# Patient Record
Sex: FEMALE | Race: BLACK OR AFRICAN AMERICAN | NOT HISPANIC OR LATINO | Employment: STUDENT | URBAN - METROPOLITAN AREA
[De-identification: names, ages, dates, MRNs, and addresses within clinical notes are randomized per-mention and may not be internally consistent; named-entity substitution may affect disease eponyms.]

---

## 2022-05-14 ENCOUNTER — HOSPITAL ENCOUNTER (INPATIENT)
Facility: OTHER | Age: 42
LOS: 4 days | Discharge: HOME | DRG: 743 | End: 2022-05-18
Attending: EMERGENCY MEDICINE | Admitting: OBSTETRICS & GYNECOLOGY
Payer: MEDICAID

## 2022-05-14 DIAGNOSIS — D21.9 FIBROIDS: Primary | ICD-10-CM

## 2022-05-14 DIAGNOSIS — R07.9 CHEST PAIN: ICD-10-CM

## 2022-05-14 DIAGNOSIS — Z98.890 POST-OPERATIVE STATE: ICD-10-CM

## 2022-05-14 DIAGNOSIS — N93.9 VAGINAL BLEEDING: ICD-10-CM

## 2022-05-14 LAB
ALBUMIN SERPL BCP-MCNC: 3.7 G/DL (ref 3.5–5.2)
ALP SERPL-CCNC: 72 U/L (ref 55–135)
ALT SERPL W/O P-5'-P-CCNC: 11 U/L (ref 10–44)
ANION GAP SERPL CALC-SCNC: 13 MMOL/L (ref 8–16)
AST SERPL-CCNC: 16 U/L (ref 10–40)
BACTERIA GENITAL QL WET PREP: ABNORMAL
BASOPHILS # BLD AUTO: 0.04 K/UL (ref 0–0.2)
BASOPHILS NFR BLD: 0.3 % (ref 0–1.9)
BILIRUB SERPL-MCNC: 0.1 MG/DL (ref 0.1–1)
BUN SERPL-MCNC: 10 MG/DL (ref 6–20)
CALCIUM SERPL-MCNC: 9.8 MG/DL (ref 8.7–10.5)
CHLORIDE SERPL-SCNC: 106 MMOL/L (ref 95–110)
CLUE CELLS VAG QL WET PREP: ABNORMAL
CO2 SERPL-SCNC: 23 MMOL/L (ref 23–29)
CREAT SERPL-MCNC: 0.8 MG/DL (ref 0.5–1.4)
CTP QC/QA: YES
DIFFERENTIAL METHOD: ABNORMAL
EOSINOPHIL # BLD AUTO: 0.3 K/UL (ref 0–0.5)
EOSINOPHIL NFR BLD: 2.4 % (ref 0–8)
ERYTHROCYTE [DISTWIDTH] IN BLOOD BY AUTOMATED COUNT: 16.2 % (ref 11.5–14.5)
EST. GFR  (AFRICAN AMERICAN): >60 ML/MIN/1.73 M^2
EST. GFR  (NON AFRICAN AMERICAN): >60 ML/MIN/1.73 M^2
FILAMENT FUNGI VAG WET PREP-#/AREA: ABNORMAL
GLUCOSE SERPL-MCNC: 95 MG/DL (ref 70–110)
HCT VFR BLD AUTO: 31.7 % (ref 37–48.5)
HCV AB SERPL QL IA: NEGATIVE
HGB BLD-MCNC: 10.3 G/DL (ref 12–16)
HIV 1+2 AB+HIV1 P24 AG SERPL QL IA: NEGATIVE
IMM GRANULOCYTES # BLD AUTO: 0.04 K/UL (ref 0–0.04)
IMM GRANULOCYTES NFR BLD AUTO: 0.3 % (ref 0–0.5)
LYMPHOCYTES # BLD AUTO: 1.9 K/UL (ref 1–4.8)
LYMPHOCYTES NFR BLD: 14.1 % (ref 18–48)
MCH RBC QN AUTO: 28.5 PG (ref 27–31)
MCHC RBC AUTO-ENTMCNC: 32.5 G/DL (ref 32–36)
MCV RBC AUTO: 88 FL (ref 82–98)
MONOCYTES # BLD AUTO: 1 K/UL (ref 0.3–1)
MONOCYTES NFR BLD: 7.4 % (ref 4–15)
NEUTROPHILS # BLD AUTO: 10.4 K/UL (ref 1.8–7.7)
NEUTROPHILS NFR BLD: 75.5 % (ref 38–73)
NRBC BLD-RTO: 0 /100 WBC
PLATELET # BLD AUTO: 344 K/UL (ref 150–450)
PMV BLD AUTO: 8.3 FL (ref 9.2–12.9)
POTASSIUM SERPL-SCNC: 3.8 MMOL/L (ref 3.5–5.1)
PROT SERPL-MCNC: 7.1 G/DL (ref 6–8.4)
RBC # BLD AUTO: 3.61 M/UL (ref 4–5.4)
SARS-COV-2 RDRP RESP QL NAA+PROBE: NEGATIVE
SODIUM SERPL-SCNC: 142 MMOL/L (ref 136–145)
SPECIMEN SOURCE: ABNORMAL
T VAGINALIS GENITAL QL WET PREP: ABNORMAL
WBC # BLD AUTO: 13.78 K/UL (ref 3.9–12.7)
WBC #/AREA VAG WET PREP: ABNORMAL
YEAST GENITAL QL WET PREP: ABNORMAL

## 2022-05-14 PROCEDURE — 12000002 HC ACUTE/MED SURGE SEMI-PRIVATE ROOM

## 2022-05-14 PROCEDURE — 96374 THER/PROPH/DIAG INJ IV PUSH: CPT

## 2022-05-14 PROCEDURE — 87591 N.GONORRHOEAE DNA AMP PROB: CPT | Performed by: PHYSICIAN ASSISTANT

## 2022-05-14 PROCEDURE — 85025 COMPLETE CBC W/AUTO DIFF WBC: CPT | Performed by: PHYSICIAN ASSISTANT

## 2022-05-14 PROCEDURE — 86803 HEPATITIS C AB TEST: CPT | Performed by: PHYSICIAN ASSISTANT

## 2022-05-14 PROCEDURE — G0378 HOSPITAL OBSERVATION PER HR: HCPCS

## 2022-05-14 PROCEDURE — 25000003 PHARM REV CODE 250: Performed by: PHYSICIAN ASSISTANT

## 2022-05-14 PROCEDURE — 87491 CHLMYD TRACH DNA AMP PROBE: CPT | Performed by: PHYSICIAN ASSISTANT

## 2022-05-14 PROCEDURE — 87389 HIV-1 AG W/HIV-1&-2 AB AG IA: CPT | Performed by: PHYSICIAN ASSISTANT

## 2022-05-14 PROCEDURE — 99285 EMERGENCY DEPT VISIT HI MDM: CPT | Mod: 25

## 2022-05-14 PROCEDURE — 87210 SMEAR WET MOUNT SALINE/INK: CPT | Performed by: PHYSICIAN ASSISTANT

## 2022-05-14 PROCEDURE — 63600175 PHARM REV CODE 636 W HCPCS: Performed by: PHYSICIAN ASSISTANT

## 2022-05-14 PROCEDURE — U0002 COVID-19 LAB TEST NON-CDC: HCPCS | Performed by: NURSE PRACTITIONER

## 2022-05-14 PROCEDURE — 80053 COMPREHEN METABOLIC PANEL: CPT | Performed by: PHYSICIAN ASSISTANT

## 2022-05-14 RX ORDER — DOXYCYCLINE HYCLATE 100 MG
100 TABLET ORAL
Status: COMPLETED | OUTPATIENT
Start: 2022-05-14 | End: 2022-05-14

## 2022-05-14 RX ORDER — METRONIDAZOLE 500 MG/1
500 TABLET ORAL
Status: COMPLETED | OUTPATIENT
Start: 2022-05-14 | End: 2022-05-14

## 2022-05-14 RX ORDER — IBUPROFEN 800 MG/1
800 TABLET ORAL 3 TIMES DAILY
Status: ON HOLD | COMMUNITY
End: 2022-05-17 | Stop reason: SDUPTHER

## 2022-05-14 RX ORDER — MORPHINE SULFATE 4 MG/ML
4 INJECTION, SOLUTION INTRAMUSCULAR; INTRAVENOUS
Status: COMPLETED | OUTPATIENT
Start: 2022-05-14 | End: 2022-05-14

## 2022-05-14 RX ORDER — TRANEXAMIC ACID 650 MG/1
1300 TABLET ORAL 3 TIMES DAILY
COMMUNITY

## 2022-05-14 RX ORDER — HYDROCODONE BITARTRATE AND ACETAMINOPHEN 5; 325 MG/1; MG/1
1 TABLET ORAL
Status: COMPLETED | OUTPATIENT
Start: 2022-05-14 | End: 2022-05-14

## 2022-05-14 RX ORDER — SODIUM CHLORIDE 0.9 % (FLUSH) 0.9 %
10 SYRINGE (ML) INJECTION
Status: DISCONTINUED | OUTPATIENT
Start: 2022-05-14 | End: 2022-05-19 | Stop reason: HOSPADM

## 2022-05-14 RX ORDER — TALC
6 POWDER (GRAM) TOPICAL NIGHTLY PRN
Status: DISCONTINUED | OUTPATIENT
Start: 2022-05-14 | End: 2022-05-19 | Stop reason: HOSPADM

## 2022-05-14 RX ADMIN — DOXYCYCLINE HYCLATE 100 MG: 100 TABLET, COATED ORAL at 10:05

## 2022-05-14 RX ADMIN — CEFTRIAXONE 1 G: 1 INJECTION, SOLUTION INTRAVENOUS at 10:05

## 2022-05-14 RX ADMIN — HYDROCODONE BITARTRATE AND ACETAMINOPHEN 1 TABLET: 5; 325 TABLET ORAL at 07:05

## 2022-05-14 RX ADMIN — METRONIDAZOLE 500 MG: 500 TABLET ORAL at 10:05

## 2022-05-14 RX ADMIN — MORPHINE SULFATE 4 MG: 4 INJECTION, SOLUTION INTRAMUSCULAR; INTRAVENOUS at 09:05

## 2022-05-15 PROBLEM — R10.2 PELVIC PAIN: Status: ACTIVE | Noted: 2022-05-15

## 2022-05-15 LAB
ALBUMIN SERPL BCP-MCNC: 3.3 G/DL (ref 3.5–5.2)
ALP SERPL-CCNC: 64 U/L (ref 55–135)
ALT SERPL W/O P-5'-P-CCNC: 10 U/L (ref 10–44)
ANION GAP SERPL CALC-SCNC: 11 MMOL/L (ref 8–16)
AST SERPL-CCNC: 15 U/L (ref 10–40)
BASOPHILS # BLD AUTO: 0.04 K/UL (ref 0–0.2)
BASOPHILS NFR BLD: 0.3 % (ref 0–1.9)
BILIRUB SERPL-MCNC: 0.1 MG/DL (ref 0.1–1)
BUN SERPL-MCNC: 9 MG/DL (ref 6–20)
CALCIUM SERPL-MCNC: 8.7 MG/DL (ref 8.7–10.5)
CHLORIDE SERPL-SCNC: 108 MMOL/L (ref 95–110)
CO2 SERPL-SCNC: 22 MMOL/L (ref 23–29)
CREAT SERPL-MCNC: 0.7 MG/DL (ref 0.5–1.4)
DIFFERENTIAL METHOD: ABNORMAL
EOSINOPHIL # BLD AUTO: 0.4 K/UL (ref 0–0.5)
EOSINOPHIL NFR BLD: 2.8 % (ref 0–8)
ERYTHROCYTE [DISTWIDTH] IN BLOOD BY AUTOMATED COUNT: 16.1 % (ref 11.5–14.5)
EST. GFR  (AFRICAN AMERICAN): >60 ML/MIN/1.73 M^2
EST. GFR  (NON AFRICAN AMERICAN): >60 ML/MIN/1.73 M^2
GLUCOSE SERPL-MCNC: 90 MG/DL (ref 70–110)
HCT VFR BLD AUTO: 32.5 % (ref 37–48.5)
HGB BLD-MCNC: 10.7 G/DL (ref 12–16)
IMM GRANULOCYTES # BLD AUTO: 0.05 K/UL (ref 0–0.04)
IMM GRANULOCYTES NFR BLD AUTO: 0.4 % (ref 0–0.5)
LYMPHOCYTES # BLD AUTO: 2 K/UL (ref 1–4.8)
LYMPHOCYTES NFR BLD: 15.8 % (ref 18–48)
MCH RBC QN AUTO: 28.5 PG (ref 27–31)
MCHC RBC AUTO-ENTMCNC: 32.9 G/DL (ref 32–36)
MCV RBC AUTO: 87 FL (ref 82–98)
MONOCYTES # BLD AUTO: 1.1 K/UL (ref 0.3–1)
MONOCYTES NFR BLD: 8.8 % (ref 4–15)
NEUTROPHILS # BLD AUTO: 9 K/UL (ref 1.8–7.7)
NEUTROPHILS NFR BLD: 71.9 % (ref 38–73)
NRBC BLD-RTO: 0 /100 WBC
PLATELET # BLD AUTO: 345 K/UL (ref 150–450)
PMV BLD AUTO: 8 FL (ref 9.2–12.9)
POTASSIUM SERPL-SCNC: 3.6 MMOL/L (ref 3.5–5.1)
PROT SERPL-MCNC: 6.4 G/DL (ref 6–8.4)
RBC # BLD AUTO: 3.75 M/UL (ref 4–5.4)
SODIUM SERPL-SCNC: 141 MMOL/L (ref 136–145)
WBC # BLD AUTO: 12.45 K/UL (ref 3.9–12.7)

## 2022-05-15 PROCEDURE — 63600175 PHARM REV CODE 636 W HCPCS: Performed by: EMERGENCY MEDICINE

## 2022-05-15 PROCEDURE — 80053 COMPREHEN METABOLIC PANEL: CPT | Performed by: NURSE PRACTITIONER

## 2022-05-15 PROCEDURE — 99219 PR INITIAL OBSERVATION CARE,LEVL II: CPT | Mod: ,,, | Performed by: OBSTETRICS & GYNECOLOGY

## 2022-05-15 PROCEDURE — 25000003 PHARM REV CODE 250: Performed by: NURSE PRACTITIONER

## 2022-05-15 PROCEDURE — 63600175 PHARM REV CODE 636 W HCPCS

## 2022-05-15 PROCEDURE — 11000001 HC ACUTE MED/SURG PRIVATE ROOM

## 2022-05-15 PROCEDURE — G0378 HOSPITAL OBSERVATION PER HR: HCPCS

## 2022-05-15 PROCEDURE — 85025 COMPLETE CBC W/AUTO DIFF WBC: CPT | Performed by: NURSE PRACTITIONER

## 2022-05-15 PROCEDURE — 63600175 PHARM REV CODE 636 W HCPCS: Performed by: STUDENT IN AN ORGANIZED HEALTH CARE EDUCATION/TRAINING PROGRAM

## 2022-05-15 PROCEDURE — 25000003 PHARM REV CODE 250: Performed by: STUDENT IN AN ORGANIZED HEALTH CARE EDUCATION/TRAINING PROGRAM

## 2022-05-15 PROCEDURE — 99219 PR INITIAL OBSERVATION CARE,LEVL II: ICD-10-PCS | Mod: ,,, | Performed by: OBSTETRICS & GYNECOLOGY

## 2022-05-15 RX ORDER — ONDANSETRON 4 MG/1
4 TABLET, ORALLY DISINTEGRATING ORAL EVERY 6 HOURS PRN
Status: DISCONTINUED | OUTPATIENT
Start: 2022-05-15 | End: 2022-05-16

## 2022-05-15 RX ORDER — HYDROCODONE BITARTRATE AND ACETAMINOPHEN 5; 325 MG/1; MG/1
1 TABLET ORAL EVERY 4 HOURS PRN
Status: DISCONTINUED | OUTPATIENT
Start: 2022-05-15 | End: 2022-05-16

## 2022-05-15 RX ORDER — KETOROLAC TROMETHAMINE 30 MG/ML
30 INJECTION, SOLUTION INTRAMUSCULAR; INTRAVENOUS EVERY 6 HOURS
Status: DISCONTINUED | OUTPATIENT
Start: 2022-05-15 | End: 2022-05-15

## 2022-05-15 RX ORDER — MORPHINE SULFATE 4 MG/ML
2 INJECTION, SOLUTION INTRAMUSCULAR; INTRAVENOUS
Status: COMPLETED | OUTPATIENT
Start: 2022-05-15 | End: 2022-05-15

## 2022-05-15 RX ORDER — HYDROCODONE BITARTRATE AND ACETAMINOPHEN 5; 325 MG/1; MG/1
1 TABLET ORAL
Status: COMPLETED | OUTPATIENT
Start: 2022-05-15 | End: 2022-05-15

## 2022-05-15 RX ORDER — HYDROCODONE BITARTRATE AND ACETAMINOPHEN 5; 325 MG/1; MG/1
1 TABLET ORAL EVERY 6 HOURS PRN
Status: DISCONTINUED | OUTPATIENT
Start: 2022-05-15 | End: 2022-05-15

## 2022-05-15 RX ORDER — HYDROCODONE BITARTRATE AND ACETAMINOPHEN 5; 325 MG/1; MG/1
1 TABLET ORAL
Status: DISCONTINUED | OUTPATIENT
Start: 2022-05-15 | End: 2022-05-15

## 2022-05-15 RX ORDER — HYDROMORPHONE HYDROCHLORIDE 1 MG/ML
0.5 INJECTION, SOLUTION INTRAMUSCULAR; INTRAVENOUS; SUBCUTANEOUS
Status: DISCONTINUED | OUTPATIENT
Start: 2022-05-15 | End: 2022-05-16

## 2022-05-15 RX ORDER — CLINDAMYCIN PHOSPHATE 900 MG/50ML
900 INJECTION, SOLUTION INTRAVENOUS
Status: DISCONTINUED | OUTPATIENT
Start: 2022-05-15 | End: 2022-05-16

## 2022-05-15 RX ORDER — KETOROLAC TROMETHAMINE 30 MG/ML
30 INJECTION, SOLUTION INTRAMUSCULAR; INTRAVENOUS EVERY 6 HOURS
Status: COMPLETED | OUTPATIENT
Start: 2022-05-15 | End: 2022-05-16

## 2022-05-15 RX ORDER — HYDROCODONE BITARTRATE AND ACETAMINOPHEN 10; 325 MG/1; MG/1
1 TABLET ORAL EVERY 6 HOURS PRN
Status: DISCONTINUED | OUTPATIENT
Start: 2022-05-15 | End: 2022-05-15

## 2022-05-15 RX ORDER — HYDROCODONE BITARTRATE AND ACETAMINOPHEN 10; 325 MG/1; MG/1
1 TABLET ORAL EVERY 4 HOURS PRN
Status: DISCONTINUED | OUTPATIENT
Start: 2022-05-15 | End: 2022-05-16

## 2022-05-15 RX ORDER — KETOROLAC TROMETHAMINE 30 MG/ML
30 INJECTION, SOLUTION INTRAMUSCULAR; INTRAVENOUS EVERY 6 HOURS PRN
Status: DISCONTINUED | OUTPATIENT
Start: 2022-05-15 | End: 2022-05-15

## 2022-05-15 RX ORDER — DEXTROSE MONOHYDRATE, SODIUM CHLORIDE, AND POTASSIUM CHLORIDE 50; 1.49; 4.5 G/1000ML; G/1000ML; G/1000ML
INJECTION, SOLUTION INTRAVENOUS CONTINUOUS
Status: DISCONTINUED | OUTPATIENT
Start: 2022-05-15 | End: 2022-05-15

## 2022-05-15 RX ORDER — DEXTROSE MONOHYDRATE, SODIUM CHLORIDE, AND POTASSIUM CHLORIDE 50; 1.49; 4.5 G/1000ML; G/1000ML; G/1000ML
INJECTION, SOLUTION INTRAVENOUS CONTINUOUS
Status: DISCONTINUED | OUTPATIENT
Start: 2022-05-15 | End: 2022-05-16

## 2022-05-15 RX ORDER — PROMETHAZINE HYDROCHLORIDE 12.5 MG/1
12.5 TABLET ORAL EVERY 6 HOURS PRN
Status: DISCONTINUED | OUTPATIENT
Start: 2022-05-15 | End: 2022-05-16

## 2022-05-15 RX ADMIN — TOBRAMYCIN SULFATE 260 MG: 40 INJECTION, SOLUTION INTRAMUSCULAR; INTRAVENOUS at 12:05

## 2022-05-15 RX ADMIN — CLINDAMYCIN IN 5 PERCENT DEXTROSE 900 MG: 18 INJECTION, SOLUTION INTRAVENOUS at 06:05

## 2022-05-15 RX ADMIN — KETOROLAC TROMETHAMINE 30 MG: 30 INJECTION, SOLUTION INTRAMUSCULAR at 11:05

## 2022-05-15 RX ADMIN — KETOROLAC TROMETHAMINE 30 MG: 30 INJECTION, SOLUTION INTRAMUSCULAR at 04:05

## 2022-05-15 RX ADMIN — Medication 6 MG: at 11:05

## 2022-05-15 RX ADMIN — KETOROLAC TROMETHAMINE 30 MG: 30 INJECTION, SOLUTION INTRAMUSCULAR; INTRAVENOUS at 04:05

## 2022-05-15 RX ADMIN — HYDROCODONE BITARTRATE AND ACETAMINOPHEN 1 TABLET: 10; 325 TABLET ORAL at 11:05

## 2022-05-15 RX ADMIN — PROMETHAZINE HYDROCHLORIDE 12.5 MG: 12.5 TABLET ORAL at 04:05

## 2022-05-15 RX ADMIN — MORPHINE SULFATE 2 MG: 4 INJECTION, SOLUTION INTRAMUSCULAR; INTRAVENOUS at 03:05

## 2022-05-15 RX ADMIN — HYDROCODONE BITARTRATE AND ACETAMINOPHEN 1 TABLET: 5; 325 TABLET ORAL at 12:05

## 2022-05-15 RX ADMIN — DEXTROSE, SODIUM CHLORIDE, AND POTASSIUM CHLORIDE: 5; .45; .15 INJECTION INTRAVENOUS at 11:05

## 2022-05-15 RX ADMIN — CLINDAMYCIN IN 5 PERCENT DEXTROSE 900 MG: 18 INJECTION, SOLUTION INTRAVENOUS at 10:05

## 2022-05-15 RX ADMIN — HYDROMORPHONE HYDROCHLORIDE 0.5 MG: 1 INJECTION, SOLUTION INTRAMUSCULAR; INTRAVENOUS; SUBCUTANEOUS at 10:05

## 2022-05-15 NOTE — ED NOTES
Pt care transfer report given to MARIIA Durand in -[facility], pt to be transported to bed 352

## 2022-05-15 NOTE — CONSULTS
"[facility] ([facility] - Emergency Dept (Observation)  Gynecology  Consult Note    Patient Name: Orquidea Petersen  MRN: [id number]  Admission Date: 2022  Hospital Length of Stay: 0 days  Attending Physician: No att. providers found  Primary Care Provider: PARKER Rivera    Inpatient consult to Obstetrics / Gynecology  Consult performed by: Teri Ag MD  Consult ordered by: ELENA Edgar  Reason for consult: Intractable pelvic pain        Subjective:     Principal Problem:Pelvic pain    History of Present Illness:   Orquidea Petersen is a 41 y.o. F who presented to the ED reporting pelvic pain and abnormal vaginal discharge. Patient states that 1 week ago, she started her menstrual cycle. Later that day, she developed severe lower abdominal pain and started to pass "light pink, meaty tissue" (see picture in media) via her vagina. She has taken ibuprofen throughout the week without relief and continued to noticed small pieces of pink tissue in her underwear. She presented to [facility] ED on  and had an ultrasound performed which showed a 6cm submucosal fibroid (previously 8cm) and a 6cm left ovarian cyst. Yesterday, her pain and nausea became severe so she decided to come into the ED. She denies fevers, chills, emesis, and diarrhea.     Of note, patient has a history of AUB-L and takes TXA and Fe supplements. She had an endometrial biopsy performed by her OB/GYN at [facility] on 22 but pipelle was only able to be passed to 4cm. She has a hysteroscopy/D&C booked next month at [facility]. She has no other PMHx. Her PSurgHx includes  section x1.       OB History   No obstetric history on file.     Past Medical History:   Diagnosis Date    Uterine fibroid      History reviewed. No pertinent surgical history.    No current facility-administered medications on file prior to encounter.     Current Outpatient Medications on File Prior to Encounter   Medication Sig    ibuprofen (ADVIL,MOTRIN) 800 " MG tablet Take 800 mg by mouth 3 (three) times daily.    tranexamic acid (LYSTEDA) 650 mg tablet Take 1,300 mg by mouth 3 (three) times daily.       Review of patient's allergies indicates:  No Known Allergies     Family History    None       Tobacco Use    Smoking status: Never Smoker    Smokeless tobacco: Not on file   Substance and Sexual Activity    Alcohol use: No    Drug use: No    Sexual activity: Not on file     Review of Systems   Constitutional: Negative for chills and fever.   Eyes: Negative for visual disturbance.   Respiratory: Negative for shortness of breath.    Cardiovascular: Negative for chest pain.   Gastrointestinal: Positive for abdominal pain and nausea. Negative for constipation, diarrhea and vomiting.   Endocrine: Negative for diabetes.   Genitourinary: Positive for pelvic pain, vaginal bleeding and vaginal discharge. Negative for dysuria.   Musculoskeletal: Negative for back pain.   Integumentary:  Negative for rash.   Neurological: Negative for headaches.   Psychiatric/Behavioral: The patient is not nervous/anxious.       Objective:     Vital Signs (Most Recent):  Temp: 98.2 °F (36.8 °C) (05/15/22 0442)  Pulse: 88 (05/15/22 0442)  Resp: 16 (05/15/22 0442)  BP: 106/61 (05/15/22 0442)  SpO2: 100 % (05/15/22 0442) Vital Signs (24h Range):  Temp:  [97.8 °F (36.6 °C)-98.4 °F (36.9 °C)] 98.2 °F (36.8 °C)  Pulse:  [81-88] 88  Resp:  [16-20] 16  SpO2:  [100 %] 100 %  BP: (106-125)/(61-80) 106/61     Weight: 64.4 kg (142 lb)  Body mass index is 25.15 kg/m².    Physical Exam:   Constitutional: She is oriented to person, place, and time. She appears well-developed and well-nourished. No distress.    HENT:   Head: Normocephalic and atraumatic.      Cardiovascular: Normal rate.     Pulmonary/Chest: Effort normal. No respiratory distress.        Abdominal: Soft. There is abdominal tenderness (tender to light and deep palpation over suprapubic area; tender to deep palpation over LLQ). There is no  rebound and no guarding.     Genitourinary:    Genitourinary Comments: On speculum exam, a large amount of light brown/yellow discharge was noted in the vaginal vault. The cervix was visually closed; no tissue was noted. On bimanual exam, there was no CMT, however, the patient did have suprapubic tenderness.             Musculoskeletal: No tenderness.       Neurological: She is alert and oriented to person, place, and time.    Skin: Skin is warm and dry.    Psychiatric: She has a normal mood and affect.         Significant Labs:  Recent Labs   Lab 05/14/22  2123 05/15/22  0444   WBC 13.78* 12.45   HGB 10.3* 10.7*   HCT 31.7* 32.5*   MCV 88 87    345     CMP:   Recent Labs   Lab 22   GLU 95   CALCIUM 9.8   ALBUMIN 3.7   PROT 7.1      K 3.8   CO2 23      BUN 10   CREATININE 0.8   ALKPHOS 72   ALT 11   AST 16   BILITOT 0.1     TVUS, 2022:   Narrative & Impression  EXAMINATION:  PELVIC ULTRASOUND     CLINICAL HISTORY:  pelvic pain;     TECHNIQUE:  Real-time ultrasound of the pelvis was performed transabdominally and transvaginally.     COMPARISON:  None.     FINDINGS:  The uterus measures 12.1 x 8.2 x 0.3.  There is a left uterine fibroid measuring 5.5 x 5.3 x 5.3 cm.  The endometrium is not well visualized secondary to the fibroid.  Nabothian cysts are seen at the lower uterine segment.     The right ovary measures 4.0 x 2.9 x 2.3 cm. Vascular flow is present.     The left ovary measures 6.6 x 4.8 x 5.5 cm. There is a 5.4 x 4.5 x 5.2 cm left ovarian cyst. Vascular flow is present.       There is no free fluid in the cul-de-sac.     Impression:     Uterine fibroid.     Left ovarian cyst.    Assessment/Plan:     41 y.o. female  admitted to ED observation unit for pelvic pain. GYN consulted for evaluation.     Active Diagnoses:    Diagnosis Date Noted POA    PRINCIPAL PROBLEM:  Pelvic pain [R10.2] 05/15/2022 Yes      Problems Resolved During this Admission:       1. Pelvic pain:  -  VSS  - WBC 14 > 12  - Patient with uterine tenderness and copious amount of light brown/yellow discharge on exam  - TVUS with 5.5cm uterine fibroid (previously 8-9cm in March 2022) and 5.4cm left ovarian cyst - see ultrasound results above  - Doxy/flagyl initiated by ED staff; could consider tobramycin/clindamycin given uterine tenderness and abnormal discharge  - Would schedule NSAIDs for this patient with norco prn for pain control  - Clinical picture most consistent with degenerating fibroid. Patient reports significant pain that is preventing her from working and performing ADLs. Will discuss possibly adding patient on for hysteroscopic myomectomy/D&C for 5/15 or 5/16    Thank you for your consult. We will follow up with patient. Please page GYN if you have any questions.    Teri Ag MD  OB/GYN, PGY-4  [facility] - Emergency Dept (Observation)    Addended to adjust co-sign to Dr. Medley  Pt interaction and documentation per Dr. Kimberli Lyles MD  OBGYN PGY-3

## 2022-05-15 NOTE — CARE UPDATE
MD to bedside. Patient reports continued left sided abdominal pain. She reports relief of pain with pain medications but states that the pain intensifies once the medicine starts to wear off.     Physical exam:  Abdomen: soft non-distended, distractable LLQ tenderness, no guarding or rebound    Consents signed for diagnostic laparoscopy and hysteroscope D&C with possible hysteroscopic myomectomy/polypectomy. All questions answered    Regla Hagan MD  PGY-1 OB/GYN

## 2022-05-15 NOTE — CARE UPDATE
To patient remove for repeat abdominal exam.    Patient states pain is improved since toradol at 4:45 am. Much improved. She denies n/v. Still passing tissue but not noticing bleeding outside of that.     On review, patient states she takes TXA for heavy periods and iron. She was prescribed myfembree for fibroids 4 months ago by [employer] provider, but she did not ever start this medication, she states it was not at the pharmacy when she picked up the TXA.     OB history:   , then    Miscarriage 1st trimester managed withOUT surgery    GYN:  Denies history of GC/CT, previously negative 3/18/22  Denies history of Trich, previously negative 3/18/22  Pos BV 3/18/22  HPV negative  Ca 125 85    PMH:  Denies any medical history: specifically DM, Thyroid problems, HTN, history of DVT/PE    PSH:    Denies any other abdominal surgeries, specifically appendix, gallbladder  HSG to evaluate for fertility     Physical exam:  Abdomen: soft, midline suprapubic uterine tenderness present (but decreased with distraction), no adnexal tenderness, not an acute abdomen, non distended, no rebound, no guarding    A/P:  Degenerating Fibroid with Concern for Endometritis   -toradol 30 mg IV q6h scheduled  -gent/clinda for concerning uterine tenderness and yellow/brownvaginal discharge  -GC/CT pending    -Diet Regular, Diet NPO at midnight, IV Fluid D5 1/2 NS + 20 K ordered to start at midnight  -Plan to go to OR for hysteroscope and myosure to remove fibroid tomorrow with on call gyn staff, Dr. Valdez.  - Case request placed for add on case tomorrow   -serial abdominal exam throughout the day.   -Will take over as primary team as planning surgery.     Tyler Lyles MD  OBGYN PGY-3

## 2022-05-15 NOTE — PLAN OF CARE
Pt transferred from ED via wheelchair w/ transport. Pt complains of 8/10 pain throbbing to R ABD. GYN paged for PRN pain medicine.Pt ambulates w/ stand-by assist x1 and repositions self independently. 22g R FA; SL.Room orientation given. NPO after midnight.  Pt has call light in reach, side rails up X2, bed in low position, TEDs/SCDs and nonskid socks on. Pt lying in bed in no distress. Will continue to monitor.

## 2022-05-15 NOTE — H&P
[facility] ED Observation Unit  History and Physical      I assumed care of this patient from the Emergency Department at onset of observation time, 10:48 PM on 05/14/2022.       History of Present Illness:  41-year-old female with past medical history of ovarian cyst and uterine fibroids presents the ED with increased pelvic pain and pressure.  Patient reports that pain has worsened over the past few weeks however become more significant over the past 1 week.  She did go to another ER earlier this week where she had labs and ultrasound revealing fibroid measuring 6 x 6 x 6 and left probable ovarian versus paraovarian cyst measuring 6 cm as well.  Patient reports since this time she is passing pink tissue.  She states that the bleeding has improved with Lysteda.  She has tried prescription Motrin with no improvement pain.  She does follow with OBGYN is due to have hysteroscopy with D&C in mid June.  Patient states that she is here today as pain has become too severe.  She does report some associated nausea and lightheadedness with severe episodes of pain.  Denies any fever.  She also reports copious increased vaginal discharge.  She denies any sexual activity or concern of STI      ED Course:  Abdomen with chest palpation of the suprapubic and lower quadrants.  No guarding, rebound or rigidity.   exam reveals. Copious amounts of yellow discharge with blood tinge.  No significant vaginal bleeding.  Uterine tenderness on exam.  No adnexal tenderness.    Did discuss with gyn who suspects necrotic fibroid.  Recommend urgent surgical intervention however did not recommend emergent surgical recommendations at this time.Discussed plan for observation for pain control, antibiotics and gyn consult in the morning    I reviewed the ED Provider Note dated 05/14/2022 prior to my evaluation of this patient.  I reviewed all labs and imaging performed in the Main ED, prior to patient being placed in Observation. Patient was  placed in the ED Observation Unit for pelvic pain    PMHx   Past Medical History:   Diagnosis Date    Uterine fibroid       History reviewed. No pertinent surgical history.     Family Hx   History reviewed. No pertinent family history.     Social Hx   Social History     Socioeconomic History    Marital status: Single   Tobacco Use    Smoking status: Never Smoker   Substance and Sexual Activity    Alcohol use: No    Drug use: No        Vital Signs   Vitals:    05/14/22 1916 05/14/22 1958 05/14/22 2131 05/14/22 2141   BP: 119/67   122/73   Pulse: 86   86   Resp: 18 18 18 18   Temp: 98.4 °F (36.9 °C)   98.3 °F (36.8 °C)   TempSrc: Oral      SpO2: 100%   100%   Weight: 64.4 kg (142 lb)          Review of Systems  Review of Systems   Constitutional: Negative for chills and fever.   Respiratory: Negative for cough and shortness of breath.    Cardiovascular: Negative for chest pain.   Gastrointestinal: Positive for abdominal pain. Negative for nausea and vomiting.   Genitourinary:        Vaginal bleeding   Neurological: Negative for headaches.   All other systems reviewed and are negative.      Brief Physical Exam/Reassessment   Physical Exam    Nursing note and vitals reviewed.  Constitutional: Vital signs are normal. She appears well-developed and well-nourished. She does not appear ill. No distress.   HENT:   Head: Normocephalic and atraumatic.   Mouth/Throat: Mucous membranes are normal. Mucous membranes are not pale and not dry.   Neck: Neck supple.   Cardiovascular: Normal rate, regular rhythm, S1 normal, S2 normal and normal heart sounds. Exam reveals no gallop.    No murmur heard.  Pulmonary/Chest: Effort normal and breath sounds normal. No tachypnea. She has no decreased breath sounds. She has no wheezes.   Abdominal: Abdomen is soft. There is abdominal tenderness in the left lower quadrant.   No right CVA tenderness.  No left CVA tenderness. There is no rebound and no guarding.   Musculoskeletal:       Cervical back: Neck supple.     Neurological: She is alert and oriented to person, place, and time. GCS eye subscore is 4. GCS verbal subscore is 5. GCS motor subscore is 6.   Skin: Skin is warm, dry and intact.   Psychiatric: She has a normal mood and affect. Her behavior is normal.         Labs Reviewed   VAGINAL SCREEN - Abnormal; Notable for the following components:       Result Value    WBC - Vaginal Screen Moderate (*)     Bacteria - Vaginal Screen Moderate (*)     All other components within normal limits    Narrative:     Release to patient->Immediate   CBC W/ AUTO DIFFERENTIAL - Abnormal; Notable for the following components:    WBC 13.78 (*)     RBC 3.61 (*)     Hemoglobin 10.3 (*)     Hematocrit 31.7 (*)     RDW 16.2 (*)     MPV 8.3 (*)     Gran # (ANC) 10.4 (*)     Gran % 75.5 (*)     Lymph % 14.1 (*)     All other components within normal limits   C. TRACHOMATIS/N. GONORRHOEAE BY AMP DNA   HIV 1 / 2 ANTIBODY    Narrative:     Release to patient->Immediate   HEPATITIS C ANTIBODY    Narrative:     Release to patient->Immediate   COMPREHENSIVE METABOLIC PANEL   SARS-COV-2 RDRP GENE     US Pelvis Comp with Transvag NON-OB (xpd    (Results Pending)         Medications:   Scheduled Meds:   cefTRIAXone (ROCEPHIN) IVPB  1 g Intravenous ED 1 Time     Continuous Infusions:  PRN Meds:.melatonin, sodium chloride 0.9%      Assessment/Plan:    1. Fibroids       -transvaginal US      -iv abx      - gyn consult in the am       - labs in am   2. Vaginal bleeding        Case was discussed with the ED provider, SHARMAINE Guerin

## 2022-05-15 NOTE — PROGRESS NOTES
[facility] ED Observation Unit  Progress Note      HPI   41-year-old female with past medical history of ovarian cyst and uterine fibroids presents the ED with increased pelvic pain and pressure.  Patient reports that pain has worsened over the past few weeks however become more significant over the past 1 week.  She did go to another ER earlier this week where she had labs and ultrasound revealing fibroid measuring 6 x 6 x 6 and left probable ovarian versus paraovarian cyst measuring 6 cm as well.  Patient reports since this time she is passing pink tissue.  She states that the bleeding has improved with Lysteda.  She has tried prescription Motrin with no improvement pain.  She does follow with OBGYN is due to have hysteroscopy with D&C in mid June.  Patient states that she is here today as pain has become too severe.  She does report some associated nausea and lightheadedness with severe episodes of pain.  Denies any fever.  She also reports copious increased vaginal discharge.  She denies any sexual activity or concern of STI       ED Course:  Abdomen with chest palpation of the suprapubic and lower quadrants.  No guarding, rebound or rigidity.   exam reveals. Copious amounts of yellow discharge with blood tinge.  No significant vaginal bleeding.  Uterine tenderness on exam.  No adnexal tenderness.     Did discuss with gyn who suspects necrotic fibroid.  Recommend urgent surgical intervention however did not recommend emergent surgical recommendations at this time.Discussed plan for observation for pain control, antibiotics and gyn consult in the morning    Interval History   evaluated by GYN who recommend a myomectomy and hysterectomy tomorrow morning.    Labs improved, resolved elevated WBC.    PMHx   Past Medical History:   Diagnosis Date    Uterine fibroid       History reviewed. No pertinent surgical history.     Family Hx   History reviewed. No pertinent family history.     Social Hx   Social History  "    Socioeconomic History    Marital status: Single   Tobacco Use    Smoking status: Never Smoker   Substance and Sexual Activity    Alcohol use: No    Drug use: No        Vital Signs   Vitals:    05/15/22 0327 05/15/22 0442 05/15/22 0908 05/15/22 0939   BP:  106/61  (!) 108/57   BP Location:  Left arm  Left arm   Patient Position:  Lying  Lying   Pulse:  88  88   Resp: 20 16     Temp:  98.2 °F (36.8 °C)  98.1 °F (36.7 °C)   TempSrc:  Oral  Oral   SpO2:  100%  100%   Weight:       Height:   5' 3" (1.6 m)         Review of Systems  Review of Systems   Constitutional: Negative for chills and fever.   Respiratory: Negative for cough and shortness of breath.    Gastrointestinal: Positive for abdominal pain (improving). Negative for nausea and vomiting.   Neurological: Negative for headaches.   All other systems reviewed and are negative.      Brief Physical Exam/Reassessment   Physical Exam    Nursing note and vitals reviewed.  Constitutional: Vital signs are normal. She appears well-developed and well-nourished. She does not appear ill. No distress.   Neck: Neck supple.   Cardiovascular: Normal rate, regular rhythm, S1 normal, S2 normal and normal heart sounds. Exam reveals no gallop.    No murmur heard.  Pulmonary/Chest: Effort normal and breath sounds normal. No tachypnea. She has no decreased breath sounds. She has no wheezes.   Abdominal: Abdomen is flat. There is abdominal tenderness in the suprapubic area.   No right CVA tenderness.  No left CVA tenderness. There is no rebound and no guarding.   Musculoskeletal:      Cervical back: Neck supple.     Neurological: She is alert and oriented to person, place, and time. GCS eye subscore is 4. GCS verbal subscore is 5. GCS motor subscore is 6.   Skin: Skin is warm, dry and intact.   Psychiatric: She has a normal mood and affect. Her behavior is normal.         Labs/Imaging   Labs Reviewed   VAGINAL SCREEN - Abnormal; Notable for the following components:       " Result Value    WBC - Vaginal Screen Moderate (*)     Bacteria - Vaginal Screen Moderate (*)     All other components within normal limits    Narrative:     Release to patient->Immediate   CBC W/ AUTO DIFFERENTIAL - Abnormal; Notable for the following components:    WBC 13.78 (*)     RBC 3.61 (*)     Hemoglobin 10.3 (*)     Hematocrit 31.7 (*)     RDW 16.2 (*)     MPV 8.3 (*)     Gran # (ANC) 10.4 (*)     Gran % 75.5 (*)     Lymph % 14.1 (*)     All other components within normal limits   CBC W/ AUTO DIFFERENTIAL - Abnormal; Notable for the following components:    RBC 3.75 (*)     Hemoglobin 10.7 (*)     Hematocrit 32.5 (*)     RDW 16.1 (*)     MPV 8.0 (*)     Gran # (ANC) 9.0 (*)     Immature Grans (Abs) 0.05 (*)     Mono # 1.1 (*)     Lymph % 15.8 (*)     All other components within normal limits   COMPREHENSIVE METABOLIC PANEL - Abnormal; Notable for the following components:    CO2 22 (*)     Albumin 3.3 (*)     All other components within normal limits   C. TRACHOMATIS/N. GONORRHOEAE BY AMP DNA   HIV 1 / 2 ANTIBODY    Narrative:     Release to patient->Immediate   HEPATITIS C ANTIBODY    Narrative:     Release to patient->Immediate   COMPREHENSIVE METABOLIC PANEL   SARS-COV-2 RDRP GENE      Imaging Results          US Pelvis Comp with Transvag NON-OB (xpd (Final result)  Result time 05/15/22 00:08:25    Final result by Jaye Broussard MD (05/15/22 00:08:25)                 Impression:      Uterine fibroid.    Left ovarian cyst.      Electronically signed by: Jaye Broussard  Date:    05/15/2022  Time:    00:08             Narrative:    EXAMINATION:  PELVIC ULTRASOUND    CLINICAL HISTORY:  pelvic pain;    TECHNIQUE:  Real-time ultrasound of the pelvis was performed transabdominally and transvaginally.    COMPARISON:  None.    FINDINGS:  The uterus measures 12.1 x 8.2 x 0.3.  There is a left uterine fibroid measuring 5.5 x 5.3 x 5.3 cm.  The endometrium is not well visualized secondary to the fibroid.   Nabothian cysts are seen at the lower uterine segment.    The right ovary measures 4.0 x 2.9 x 2.3 cm. Vascular flow is present.    The left ovary measures 6.6 x 4.8 x 5.5 cm. There is a 5.4 x 4.5 x 5.2 cm left ovarian cyst. Vascular flow is present.    .    There is no free fluid in the cul-de-sac.                                 I reviewed all labs, imaging,      Plan   1. Fibroids        -will transfer to GYN service for procedure tomorrow.   2. Vaginal bleeding        I have discussed this case with Mandy Lyles MD.

## 2022-05-15 NOTE — ED PROVIDER NOTES
"Encounter Date: 5/14/2022       History     Chief Complaint   Patient presents with    Female  Problem     Vaginal Pain onset 1 week ago. Pt has hx of fibroids, she states she has been passing "pink tissue" for a week. NAD. AAOx4. GCS:15.      41-year-old female with past medical history of ovarian cyst and uterine fibroids presents the ED with increased pelvic pain and pressure.  Patient reports that pain has worsened over the past few weeks however become more significant over the past 1 week.  She did go to another ER earlier this week where she had labs and ultrasound revealing fibroid measuring 6 x 6 x 6 and left probable ovarian versus paraovarian cyst measuring 6 cm as well.  Patient reports since this time she is passing pink tissue.  She states that the bleeding has improved with Lysteda.  She has tried prescription Motrin with no improvement pain.  She does follow with OBGYN is due to have hysteroscopy with D&C in mid June.  Patient states that she is here today as pain has become too severe.  She does report some associated nausea and lightheadedness with severe episodes of pain.  Denies any fever.  She also reports copious increased vaginal discharge.  She denies any sexual activity or concern of STI.    The history is provided by the patient.     Review of patient's allergies indicates:  No Known Allergies  Past Medical History:   Diagnosis Date    Uterine fibroid      History reviewed. No pertinent surgical history.  History reviewed. No pertinent family history.  Social History     Tobacco Use    Smoking status: Never Smoker   Substance Use Topics    Alcohol use: No    Drug use: No     Review of Systems   Constitutional: Positive for appetite change. Negative for fever.   HENT: Negative for sore throat.    Respiratory: Negative for shortness of breath.    Cardiovascular: Negative for chest pain.   Gastrointestinal: Positive for abdominal distention, abdominal pain and nausea. Negative for " constipation, diarrhea and vomiting.   Genitourinary: Positive for pelvic pain, vaginal bleeding, vaginal discharge and vaginal pain. Negative for dysuria, flank pain and hematuria.   Musculoskeletal: Negative for arthralgias, back pain and myalgias.   Skin: Negative for rash.   Allergic/Immunologic: Negative for immunocompromised state.   Neurological: Negative for weakness.   Hematological: Does not bruise/bleed easily.       Physical Exam     Initial Vitals [05/14/22 1916]   BP Pulse Resp Temp SpO2   119/67 86 18 98.4 °F (36.9 °C) 100 %      MAP       --         Physical Exam    Nursing note and vitals reviewed.  Constitutional: Vital signs are normal. She appears well-developed and well-nourished. She is cooperative.  Non-toxic appearance. She does not appear ill. No distress.   HENT:   Head: Normocephalic and atraumatic.   Eyes: Conjunctivae and lids are normal.   Neck: Neck supple.   Cardiovascular: Normal rate and regular rhythm.   Pulmonary/Chest: Breath sounds normal. No respiratory distress. She has no wheezes. She has no rhonchi.   Abdominal: Abdomen is soft. Bowel sounds are normal. There is abdominal tenderness in the right lower quadrant, suprapubic area and left lower quadrant. There is no rigidity and no guarding.   Genitourinary:    Genitourinary Comments: Copious amounts of yellow discharge with blood tinge.  No significant vaginal bleeding.  Uterine tenderness on exam.  No adnexal tenderness.     Musculoskeletal:         General: Normal range of motion.      Cervical back: Neck supple. No rigidity.     Neurological: She is alert and oriented to person, place, and time. She has normal strength. No sensory deficit. GCS score is 15. GCS eye subscore is 4. GCS verbal subscore is 5. GCS motor subscore is 6.   Skin: Skin is warm, dry and intact. No rash noted.   Psychiatric: She has a normal mood and affect. Her speech is normal and behavior is normal. Thought content normal.             ED Course    Procedures  Labs Reviewed   VAGINAL SCREEN - Abnormal; Notable for the following components:       Result Value    WBC - Vaginal Screen Moderate (*)     Bacteria - Vaginal Screen Moderate (*)     All other components within normal limits    Narrative:     Release to patient->Immediate   CBC W/ AUTO DIFFERENTIAL - Abnormal; Notable for the following components:    WBC 13.78 (*)     RBC 3.61 (*)     Hemoglobin 10.3 (*)     Hematocrit 31.7 (*)     RDW 16.2 (*)     MPV 8.3 (*)     Gran # (ANC) 10.4 (*)     Gran % 75.5 (*)     Lymph % 14.1 (*)     All other components within normal limits   C. TRACHOMATIS/N. GONORRHOEAE BY AMP DNA   HIV 1 / 2 ANTIBODY    Narrative:     Release to patient->Immediate   HEPATITIS C ANTIBODY    Narrative:     Release to patient->Immediate   COMPREHENSIVE METABOLIC PANEL   SARS-COV-2 RDRP GENE          Imaging Results    None          Medications   sodium chloride 0.9% flush 10 mL (has no administration in time range)   melatonin tablet 6 mg (has no administration in time range)   cefTRIAXone (ROCEPHIN) 1 g/50 mL D5W IVPB (1 g Intravenous New Bag 5/14/22 2232)   HYDROcodone-acetaminophen 5-325 mg per tablet 1 tablet (1 tablet Oral Given 5/14/22 1958)   morphine injection 4 mg (4 mg Intravenous Given 5/14/22 2131)   doxycycline tablet 100 mg (100 mg Oral Given 5/14/22 2232)   metroNIDAZOLE tablet 500 mg (500 mg Oral Given 5/14/22 2232)     Medical Decision Making:   History:   Old Medical Records: I decided to obtain old medical records.  Initial Assessment:   Urgent evaluation 41-year-old female with worsening pelvic pain with known history of uterine fibroids and ovarian cyst.  She appears nontoxic.  She appears in distress secondary to pain.  Abdomen with chest palpation of the suprapubic and lower quadrants.  No guarding, rebound or rigidity.   exam reveals. Copious amounts of yellow discharge with blood tinge.  No significant vaginal bleeding.  Uterine tenderness on exam.  No adnexal  tenderness.  Differential Diagnosis:   Differential Diagnosis includes, but is not limited to:  STI, HSV, BV, PID, TOA, vaginal foreign body, vaginal trauma, ovarian torsion, ovarian cyst, UTI/pyelonephritis, pregnancy complication, dysmenorrhea, mittelschmertz, appendicitis, nephrolithiasis, appendicitis, colitis, diverticulitis,    Clinical Tests:   Lab Tests: Reviewed and Ordered  Radiological Study: Reviewed and Ordered  ED Management:  Reviewed previous ED visit and notes.  Reviewed ultrasound completed earlier this week.  Given her increasing pain felt reasonable to complete pelvic.  Pelvic with copious discharge.  Did discuss with gyn who suspects necrotic fibroid.  Recommend urgent surgical intervention however did not recommend emergent surgical recommendations at this time.  Will reassess labs and placed IV for pain control.  Labs notable for leukocytosis at 13. Given overall findings on physical exam believe she would benefit from repeat ultrasound to evaluate for any intrauterine infection versus tubo-ovarian abscess.  Will start on empiric PID treatment although low suspicion of G/C etiology.  Wet prep reveals moderate bacteria and white cells however no clue cells or yeast.  Discussed plan for observation for pain control, antibiotics and gyn consult in the morning.  Plan to titrate to oral antibiotics, oral pain medications and await further recommendations of gyn with probable linkage to care in the near future than mid June vs emergent if any acute changes or US findings.                       Clinical Impression:   Final diagnoses:  [D21.9] Fibroids (Primary)  [N93.9] Vaginal bleeding          ED Disposition Condition    Observation               SHARMAINE Reza  05/14/22 9861

## 2022-05-16 ENCOUNTER — ANESTHESIA EVENT (OUTPATIENT)
Dept: SURGERY | Facility: OTHER | Age: 42
DRG: 743 | End: 2022-05-16
Payer: MEDICAID

## 2022-05-16 ENCOUNTER — ANESTHESIA (OUTPATIENT)
Dept: SURGERY | Facility: OTHER | Age: 42
DRG: 743 | End: 2022-05-16
Payer: MEDICAID

## 2022-05-16 LAB
ABO + RH BLD: NORMAL
BLD GP AB SCN CELLS X3 SERPL QL: NORMAL
C TRACH DNA SPEC QL NAA+PROBE: NOT DETECTED
N GONORRHOEA DNA SPEC QL NAA+PROBE: NOT DETECTED

## 2022-05-16 PROCEDURE — 58661 PR LAP,RMV  ADNEXAL STRUCTURE: ICD-10-PCS | Mod: 22,LT,, | Performed by: OBSTETRICS & GYNECOLOGY

## 2022-05-16 PROCEDURE — 58561 PR HYSTEROSCOPY,RMV MYOMA: ICD-10-PCS | Mod: ,,, | Performed by: OBSTETRICS & GYNECOLOGY

## 2022-05-16 PROCEDURE — 94799 UNLISTED PULMONARY SVC/PX: CPT

## 2022-05-16 PROCEDURE — 63600175 PHARM REV CODE 636 W HCPCS: Performed by: STUDENT IN AN ORGANIZED HEALTH CARE EDUCATION/TRAINING PROGRAM

## 2022-05-16 PROCEDURE — 63600175 PHARM REV CODE 636 W HCPCS: Performed by: NURSE ANESTHETIST, CERTIFIED REGISTERED

## 2022-05-16 PROCEDURE — 71000039 HC RECOVERY, EACH ADD'L HOUR: Performed by: OBSTETRICS & GYNECOLOGY

## 2022-05-16 PROCEDURE — 49320 DIAG LAPARO SEPARATE PROC: CPT | Mod: 59,,, | Performed by: OBSTETRICS & GYNECOLOGY

## 2022-05-16 PROCEDURE — 88305 TISSUE EXAM BY PATHOLOGIST: ICD-10-PCS | Mod: 26,,, | Performed by: PATHOLOGY

## 2022-05-16 PROCEDURE — 86901 BLOOD TYPING SEROLOGIC RH(D): CPT

## 2022-05-16 PROCEDURE — 36000708 HC OR TIME LEV III 1ST 15 MIN: Performed by: OBSTETRICS & GYNECOLOGY

## 2022-05-16 PROCEDURE — 94761 N-INVAS EAR/PLS OXIMETRY MLT: CPT

## 2022-05-16 PROCEDURE — 99226 PR SUBSEQUENT OBSERVATION CARE,LEVEL III: ICD-10-PCS | Mod: 57,,, | Performed by: OBSTETRICS & GYNECOLOGY

## 2022-05-16 PROCEDURE — 25000003 PHARM REV CODE 250: Performed by: STUDENT IN AN ORGANIZED HEALTH CARE EDUCATION/TRAINING PROGRAM

## 2022-05-16 PROCEDURE — 36000709 HC OR TIME LEV III EA ADD 15 MIN: Performed by: OBSTETRICS & GYNECOLOGY

## 2022-05-16 PROCEDURE — C1782 MORCELLATOR: HCPCS | Performed by: OBSTETRICS & GYNECOLOGY

## 2022-05-16 PROCEDURE — 99900035 HC TECH TIME PER 15 MIN (STAT)

## 2022-05-16 PROCEDURE — 27201423 OPTIME MED/SURG SUP & DEVICES STERILE SUPPLY: Performed by: OBSTETRICS & GYNECOLOGY

## 2022-05-16 PROCEDURE — 25000003 PHARM REV CODE 250: Performed by: ANESTHESIOLOGY

## 2022-05-16 PROCEDURE — 36415 COLL VENOUS BLD VENIPUNCTURE: CPT

## 2022-05-16 PROCEDURE — 25000003 PHARM REV CODE 250: Performed by: OBSTETRICS & GYNECOLOGY

## 2022-05-16 PROCEDURE — 58561 HYSTEROSCOPY REMOVE MYOMA: CPT | Mod: ,,, | Performed by: OBSTETRICS & GYNECOLOGY

## 2022-05-16 PROCEDURE — 11000001 HC ACUTE MED/SURG PRIVATE ROOM

## 2022-05-16 PROCEDURE — 88305 TISSUE EXAM BY PATHOLOGIST: CPT | Mod: 26,,, | Performed by: PATHOLOGY

## 2022-05-16 PROCEDURE — 25000003 PHARM REV CODE 250: Performed by: NURSE ANESTHETIST, CERTIFIED REGISTERED

## 2022-05-16 PROCEDURE — 71000033 HC RECOVERY, INTIAL HOUR: Performed by: OBSTETRICS & GYNECOLOGY

## 2022-05-16 PROCEDURE — 99226 PR SUBSEQUENT OBSERVATION CARE,LEVEL III: CPT | Mod: 57,,, | Performed by: OBSTETRICS & GYNECOLOGY

## 2022-05-16 PROCEDURE — 58661 LAPAROSCOPY REMOVE ADNEXA: CPT | Mod: 22,LT,, | Performed by: OBSTETRICS & GYNECOLOGY

## 2022-05-16 PROCEDURE — 88305 TISSUE EXAM BY PATHOLOGIST: CPT | Performed by: PATHOLOGY

## 2022-05-16 PROCEDURE — 49320 PR LAP,DIAGNOSTIC ABDOMEN: ICD-10-PCS | Mod: 59,,, | Performed by: OBSTETRICS & GYNECOLOGY

## 2022-05-16 PROCEDURE — 37000009 HC ANESTHESIA EA ADD 15 MINS: Performed by: OBSTETRICS & GYNECOLOGY

## 2022-05-16 PROCEDURE — P9045 ALBUMIN (HUMAN), 5%, 250 ML: HCPCS | Mod: JG | Performed by: NURSE ANESTHETIST, CERTIFIED REGISTERED

## 2022-05-16 PROCEDURE — 63600175 PHARM REV CODE 636 W HCPCS: Performed by: ANESTHESIOLOGY

## 2022-05-16 PROCEDURE — 37000008 HC ANESTHESIA 1ST 15 MINUTES: Performed by: OBSTETRICS & GYNECOLOGY

## 2022-05-16 RX ORDER — DIPHENHYDRAMINE HYDROCHLORIDE 50 MG/ML
INJECTION INTRAMUSCULAR; INTRAVENOUS
Status: DISCONTINUED | OUTPATIENT
Start: 2022-05-16 | End: 2022-05-16

## 2022-05-16 RX ORDER — HYDROCODONE BITARTRATE AND ACETAMINOPHEN 5; 325 MG/1; MG/1
1 TABLET ORAL EVERY 4 HOURS PRN
Status: DISCONTINUED | OUTPATIENT
Start: 2022-05-16 | End: 2022-05-19 | Stop reason: HOSPADM

## 2022-05-16 RX ORDER — ACETAMINOPHEN 10 MG/ML
INJECTION, SOLUTION INTRAVENOUS
Status: DISCONTINUED | OUTPATIENT
Start: 2022-05-16 | End: 2022-05-16

## 2022-05-16 RX ORDER — HYDROMORPHONE HYDROCHLORIDE 2 MG/ML
0.4 INJECTION, SOLUTION INTRAMUSCULAR; INTRAVENOUS; SUBCUTANEOUS EVERY 5 MIN PRN
Status: DISCONTINUED | OUTPATIENT
Start: 2022-05-16 | End: 2022-05-17

## 2022-05-16 RX ORDER — DEXMEDETOMIDINE HYDROCHLORIDE 100 UG/ML
INJECTION, SOLUTION INTRAVENOUS
Status: DISCONTINUED | OUTPATIENT
Start: 2022-05-16 | End: 2022-05-16

## 2022-05-16 RX ORDER — MUPIROCIN 20 MG/G
OINTMENT TOPICAL 2 TIMES DAILY
Status: DISCONTINUED | OUTPATIENT
Start: 2022-05-16 | End: 2022-05-16

## 2022-05-16 RX ORDER — PROPOFOL 10 MG/ML
VIAL (ML) INTRAVENOUS
Status: DISCONTINUED | OUTPATIENT
Start: 2022-05-16 | End: 2022-05-16

## 2022-05-16 RX ORDER — FENTANYL CITRATE 50 UG/ML
INJECTION, SOLUTION INTRAMUSCULAR; INTRAVENOUS
Status: DISCONTINUED | OUTPATIENT
Start: 2022-05-16 | End: 2022-05-16

## 2022-05-16 RX ORDER — MIDAZOLAM HYDROCHLORIDE 1 MG/ML
INJECTION INTRAMUSCULAR; INTRAVENOUS
Status: DISCONTINUED | OUTPATIENT
Start: 2022-05-16 | End: 2022-05-16

## 2022-05-16 RX ORDER — IBUPROFEN 600 MG/1
600 TABLET ORAL EVERY 6 HOURS PRN
Status: DISCONTINUED | OUTPATIENT
Start: 2022-05-16 | End: 2022-05-17

## 2022-05-16 RX ORDER — KETAMINE HCL IN 0.9 % NACL 50 MG/5 ML
SYRINGE (ML) INTRAVENOUS
Status: DISCONTINUED | OUTPATIENT
Start: 2022-05-16 | End: 2022-05-16

## 2022-05-16 RX ORDER — HYDROMORPHONE HYDROCHLORIDE 1 MG/ML
0.5 INJECTION, SOLUTION INTRAMUSCULAR; INTRAVENOUS; SUBCUTANEOUS EVERY 6 HOURS PRN
Status: DISCONTINUED | OUTPATIENT
Start: 2022-05-16 | End: 2022-05-17

## 2022-05-16 RX ORDER — BUPIVACAINE HYDROCHLORIDE 5 MG/ML
INJECTION, SOLUTION EPIDURAL; INTRACAUDAL
Status: DISCONTINUED | OUTPATIENT
Start: 2022-05-16 | End: 2022-05-16 | Stop reason: HOSPADM

## 2022-05-16 RX ORDER — ONDANSETRON 8 MG/1
8 TABLET, ORALLY DISINTEGRATING ORAL EVERY 8 HOURS PRN
Status: DISCONTINUED | OUTPATIENT
Start: 2022-05-16 | End: 2022-05-19 | Stop reason: HOSPADM

## 2022-05-16 RX ORDER — DEXAMETHASONE SODIUM PHOSPHATE 4 MG/ML
INJECTION, SOLUTION INTRA-ARTICULAR; INTRALESIONAL; INTRAMUSCULAR; INTRAVENOUS; SOFT TISSUE
Status: DISCONTINUED | OUTPATIENT
Start: 2022-05-16 | End: 2022-05-16

## 2022-05-16 RX ORDER — PROCHLORPERAZINE EDISYLATE 5 MG/ML
5 INJECTION INTRAMUSCULAR; INTRAVENOUS EVERY 30 MIN PRN
Status: DISCONTINUED | OUTPATIENT
Start: 2022-05-16 | End: 2022-05-19 | Stop reason: HOSPADM

## 2022-05-16 RX ORDER — OXYCODONE HYDROCHLORIDE 5 MG/1
5 TABLET ORAL
Status: DISCONTINUED | OUTPATIENT
Start: 2022-05-16 | End: 2022-05-19 | Stop reason: HOSPADM

## 2022-05-16 RX ORDER — ONDANSETRON 2 MG/ML
INJECTION INTRAMUSCULAR; INTRAVENOUS
Status: DISCONTINUED | OUTPATIENT
Start: 2022-05-16 | End: 2022-05-16

## 2022-05-16 RX ORDER — SIMETHICONE 80 MG
80 TABLET,CHEWABLE ORAL EVERY 4 HOURS PRN
Status: DISCONTINUED | OUTPATIENT
Start: 2022-05-16 | End: 2022-05-17

## 2022-05-16 RX ORDER — DIPHENHYDRAMINE HCL 25 MG
25 CAPSULE ORAL EVERY 4 HOURS PRN
Status: DISCONTINUED | OUTPATIENT
Start: 2022-05-16 | End: 2022-05-19 | Stop reason: HOSPADM

## 2022-05-16 RX ORDER — PROCHLORPERAZINE EDISYLATE 5 MG/ML
5 INJECTION INTRAMUSCULAR; INTRAVENOUS EVERY 6 HOURS PRN
Status: DISCONTINUED | OUTPATIENT
Start: 2022-05-16 | End: 2022-05-19 | Stop reason: HOSPADM

## 2022-05-16 RX ORDER — ALBUMIN HUMAN 50 G/1000ML
SOLUTION INTRAVENOUS CONTINUOUS PRN
Status: DISCONTINUED | OUTPATIENT
Start: 2022-05-16 | End: 2022-05-16

## 2022-05-16 RX ORDER — ROCURONIUM BROMIDE 10 MG/ML
INJECTION, SOLUTION INTRAVENOUS
Status: DISCONTINUED | OUTPATIENT
Start: 2022-05-16 | End: 2022-05-16

## 2022-05-16 RX ORDER — PHENYLEPHRINE HYDROCHLORIDE 10 MG/ML
INJECTION INTRAVENOUS
Status: DISCONTINUED | OUTPATIENT
Start: 2022-05-16 | End: 2022-05-16

## 2022-05-16 RX ORDER — MUPIROCIN 20 MG/G
1 OINTMENT TOPICAL 2 TIMES DAILY
Status: DISCONTINUED | OUTPATIENT
Start: 2022-05-16 | End: 2022-05-19 | Stop reason: HOSPADM

## 2022-05-16 RX ORDER — SODIUM CHLORIDE 0.9 % (FLUSH) 0.9 %
3 SYRINGE (ML) INJECTION
Status: DISCONTINUED | OUTPATIENT
Start: 2022-05-16 | End: 2022-05-19 | Stop reason: HOSPADM

## 2022-05-16 RX ORDER — HYDROCODONE BITARTRATE AND ACETAMINOPHEN 10; 325 MG/1; MG/1
1 TABLET ORAL EVERY 4 HOURS PRN
Status: DISCONTINUED | OUTPATIENT
Start: 2022-05-16 | End: 2022-05-19 | Stop reason: HOSPADM

## 2022-05-16 RX ORDER — DIPHENHYDRAMINE HYDROCHLORIDE 50 MG/ML
12.5 INJECTION INTRAMUSCULAR; INTRAVENOUS EVERY 30 MIN PRN
Status: DISCONTINUED | OUTPATIENT
Start: 2022-05-16 | End: 2022-05-19 | Stop reason: HOSPADM

## 2022-05-16 RX ORDER — MEPERIDINE HYDROCHLORIDE 25 MG/ML
12.5 INJECTION INTRAMUSCULAR; INTRAVENOUS; SUBCUTANEOUS ONCE AS NEEDED
Status: ACTIVE | OUTPATIENT
Start: 2022-05-16 | End: 2022-05-17

## 2022-05-16 RX ORDER — LIDOCAINE HYDROCHLORIDE 20 MG/ML
INJECTION INTRAVENOUS
Status: DISCONTINUED | OUTPATIENT
Start: 2022-05-16 | End: 2022-05-16

## 2022-05-16 RX ORDER — HYDROMORPHONE HYDROCHLORIDE 2 MG/ML
INJECTION, SOLUTION INTRAMUSCULAR; INTRAVENOUS; SUBCUTANEOUS
Status: DISCONTINUED | OUTPATIENT
Start: 2022-05-16 | End: 2022-05-16

## 2022-05-16 RX ADMIN — SODIUM CHLORIDE, SODIUM LACTATE, POTASSIUM CHLORIDE, AND CALCIUM CHLORIDE: .6; .31; .03; .02 INJECTION, SOLUTION INTRAVENOUS at 03:05

## 2022-05-16 RX ADMIN — DIPHENHYDRAMINE HYDROCHLORIDE 12.5 MG: 50 INJECTION, SOLUTION INTRAMUSCULAR; INTRAVENOUS at 03:05

## 2022-05-16 RX ADMIN — MIDAZOLAM HYDROCHLORIDE 2 MG: 1 INJECTION, SOLUTION INTRAMUSCULAR; INTRAVENOUS at 02:05

## 2022-05-16 RX ADMIN — ESMOLOL HYDROCHLORIDE 20 MG: 10 INJECTION INTRAVENOUS at 03:05

## 2022-05-16 RX ADMIN — DEXAMETHASONE SODIUM PHOSPHATE 8 MG: 4 INJECTION, SOLUTION INTRAMUSCULAR; INTRAVENOUS at 03:05

## 2022-05-16 RX ADMIN — SODIUM CHLORIDE, SODIUM LACTATE, POTASSIUM CHLORIDE, AND CALCIUM CHLORIDE: .6; .31; .03; .02 INJECTION, SOLUTION INTRAVENOUS at 02:05

## 2022-05-16 RX ADMIN — HYDROMORPHONE HYDROCHLORIDE 0.4 MG: 2 INJECTION INTRAMUSCULAR; INTRAVENOUS; SUBCUTANEOUS at 06:05

## 2022-05-16 RX ADMIN — PHENYLEPHRINE HYDROCHLORIDE 200 MCG: 10 INJECTION INTRAVENOUS at 04:05

## 2022-05-16 RX ADMIN — ROCURONIUM BROMIDE 20 MG: 10 SOLUTION INTRAVENOUS at 05:05

## 2022-05-16 RX ADMIN — TOBRAMYCIN SULFATE 260 MG: 40 INJECTION, SOLUTION INTRAMUSCULAR; INTRAVENOUS at 11:05

## 2022-05-16 RX ADMIN — ONDANSETRON 8 MG: 8 TABLET, ORALLY DISINTEGRATING ORAL at 08:05

## 2022-05-16 RX ADMIN — HYDROCODONE BITARTRATE AND ACETAMINOPHEN 1 TABLET: 10; 325 TABLET ORAL at 11:05

## 2022-05-16 RX ADMIN — OXYCODONE 5 MG: 5 TABLET ORAL at 06:05

## 2022-05-16 RX ADMIN — LIDOCAINE HYDROCHLORIDE 50 MG: 20 INJECTION, SOLUTION INTRAVENOUS at 02:05

## 2022-05-16 RX ADMIN — ACETAMINOPHEN 1000 MG: 10 INJECTION, SOLUTION INTRAVENOUS at 05:05

## 2022-05-16 RX ADMIN — SUGAMMADEX 150 MG: 100 INJECTION, SOLUTION INTRAVENOUS at 06:05

## 2022-05-16 RX ADMIN — ROCURONIUM BROMIDE 30 MG: 10 SOLUTION INTRAVENOUS at 03:05

## 2022-05-16 RX ADMIN — ONDANSETRON HYDROCHLORIDE 4 MG: 2 INJECTION INTRAMUSCULAR; INTRAVENOUS at 03:05

## 2022-05-16 RX ADMIN — MUPIROCIN 1 G: 20 OINTMENT TOPICAL at 08:05

## 2022-05-16 RX ADMIN — Medication 30 MG: at 02:05

## 2022-05-16 RX ADMIN — PROPOFOL 200 MG: 10 INJECTION, EMULSION INTRAVENOUS at 02:05

## 2022-05-16 RX ADMIN — KETOROLAC TROMETHAMINE 30 MG: 30 INJECTION, SOLUTION INTRAMUSCULAR at 05:05

## 2022-05-16 RX ADMIN — ROCURONIUM BROMIDE 50 MG: 10 SOLUTION INTRAVENOUS at 02:05

## 2022-05-16 RX ADMIN — DEXMEDETOMIDINE HYDROCHLORIDE 20 MCG: 100 INJECTION, SOLUTION, CONCENTRATE INTRAVENOUS at 03:05

## 2022-05-16 RX ADMIN — FENTANYL CITRATE 100 MCG: 50 INJECTION, SOLUTION INTRAMUSCULAR; INTRAVENOUS at 02:05

## 2022-05-16 RX ADMIN — CLINDAMYCIN IN 5 PERCENT DEXTROSE 900 MG: 18 INJECTION, SOLUTION INTRAVENOUS at 01:05

## 2022-05-16 RX ADMIN — PHENYLEPHRINE HYDROCHLORIDE 0.2 MCG/KG/MIN: 10 INJECTION INTRAVENOUS at 04:05

## 2022-05-16 RX ADMIN — HYDROMORPHONE HYDROCHLORIDE 0.6 MG: 2 INJECTION INTRAMUSCULAR; INTRAVENOUS; SUBCUTANEOUS at 06:05

## 2022-05-16 RX ADMIN — CLINDAMYCIN IN 5 PERCENT DEXTROSE 900 MG: 18 INJECTION, SOLUTION INTRAVENOUS at 09:05

## 2022-05-16 RX ADMIN — ALBUMIN (HUMAN): 12.5 SOLUTION INTRAVENOUS at 05:05

## 2022-05-16 NOTE — TRANSFER OF CARE
"Anesthesia Transfer of Care Note    Patient: Orquidea Petersen    Procedure(s) Performed: Procedure(s) (LRB):  HYSTEROSCOPIC MYOMECTOMY  AND D&C, DIAGNOSTIC LAPAROSCOPY (N/A)  LAPAROSCOPY, DIAGNOSTIC (N/A)  LAPAROSCOPIC LYSIS OF ADHESIONS (Left)  LAPAROSCOPIC LYSIS OF ADHESIONS (N/A)    Patient location: PACU    Transport from OR: Transported from OR on 2-3 L/min O2 by NC with adequate spontaneous ventilation    Post pain: adequate analgesia    Post assessment: no apparent anesthetic complications    Post vital signs: stable    Level of consciousness: awake and responds to stimulation    Nausea/Vomiting: no nausea/vomiting    Complications: none    Transfer of care protocol was followed      Last vitals:   Visit Vitals  /81   Pulse 90   Temp 37.4 °C (99.4 °F)   Resp 18   Ht 5' 3" (1.6 m)   Wt 64.4 kg (142 lb)   SpO2 99%   Breastfeeding No   BMI 25.15 kg/m²     "

## 2022-05-16 NOTE — BRIEF OP NOTE
[facility] Med Surg ([facility])  Brief Operative Note    Surgery Date: 5/16/2022     Surgeon(s) and Role:  Panel 1:     * Wilmer Valdez MD - Primary     * Malissa Molina MD - Resident - Assisting     * Regla Hagan MD - Resident - Observing  Panel 2:     * Arnie Rowan MD - Primary     * Arnie Rowan MD      Pre-op Diagnosis:   - Pelvic Pain  - Submucosal Leiomyoma with AUB  - Left adnexal mass    Post-op Diagnosis:  Post-Op Diagnosis Codes:  - Pelvic pain  - Stage 4 endometriosis  - Submucosal Uterine Leiomyoma  - ~5 cm left endometrioma  - s/p LSO/ extensive lysis of adhesions/ hysteroscopic myomectomy    Procedure(s) (LRB):  DIAGNOSTIC LAPAROSCOPY, HYSTEROSCOPIC MYOMECTOMY  AND D&C, (N/A)  LAPAROSCOPIC LYSIS OF ADHESIONS, LEFT SALPINGO-OOPHORECTOMY (Left)    Anesthesia: General/Regional    Operative Findings:   - On BME: Cervix feels approx 1cm dilated, and uterus noted to be around 12-13 wk size, with palpable mass on the posterior R portion. Uterus sounded to 11cm.   - Laparoscopically: On entry to the abdomen, dense adhesive disease and extensive endometiosis noted in the pelvis. Normal upper abdominal survey. Cystic structure present under the sigmoid/ extensive adhesions. L fallopian tube appeared dilated and adhered to the uterus.   - Gyn Onc called due to complexity of adhesions (see separate op note for further details). LSO performed, and extensive lysis of adhesions performed.   - Hemoblast placed over wound bed and hemostasis observed.   - Hysteroscopic myomectomy performed with myosure XL.     Estimated Blood Loss: 180 mL         Specimens:   Specimen (24h ago, onward)                 Start     Ordered    05/16/22 1740  Specimen to Pathology, Surgery Gynecology and Obstetrics  Once        Comments: Pre-op Diagnosis: Fibroids [D21.9]Procedure(s):MYOMECTOMY, HYSTEROSCOPICLAPAROSCOPY, DIAGNOSTICABLATION, ENDOMETRIUMLAPAROSCOPIC LYSIS OF ADHESIONSLAPAROSCOPIC LYSIS OF ADHESIONS Number of  specimens: 3Name of specimens: 1. LEFT TUBE AND OVARY2. UTERINE FIBROIDS 3. ENDOMETRIAL CURETTINGS     References:    Click here for ordering Quick Tip   Question Answer Comment   Procedure Type: Gynecology and Obstetrics    Specimen Class: Routine/Screening    Which provider would you like to cc? DEEPALI JAIMES.    Release to patient Immediate        05/16/22 8865                      Malissa Molina M.D.   OB/GYN  PGY-3    I was present and scrubbed for the surgery as listed above. I agree with the contents of the brief operative report documented above. Left adnexal mass was densely adherent to pelvic sidewall, uterus and colon and intraoperative consult with GYN ONC was obtained for assistance with management of left adnexal mass.    Deepali Jaimes  5/17/2022

## 2022-05-16 NOTE — PLAN OF CARE
Plan of care reviewed with patient. Pt AAOx4. Pt remains free from fall, injury, and skin breakdown. NPO after midnight. Positions self independently. Purposeful hourly rounding done. Safety maintained.     Problem: Adult Inpatient Plan of Care  Goal: Plan of Care Review  Outcome: Ongoing, Progressing  Goal: Patient-Specific Goal (Individualized)  Outcome: Ongoing, Progressing  Goal: Absence of Hospital-Acquired Illness or Injury  Outcome: Ongoing, Progressing  Goal: Optimal Comfort and Wellbeing  Outcome: Ongoing, Progressing  Goal: Readiness for Transition of Care  Outcome: Ongoing, Progressing     Problem: Pain Acute  Goal: Acceptable Pain Control and Functional Ability  Outcome: Ongoing, Progressing     Problem: Fall Injury Risk  Goal: Absence of Fall and Fall-Related Injury  Outcome: Ongoing, Progressing

## 2022-05-16 NOTE — PROGRESS NOTES
[facility]  Obstetrics & Gynecology  Progress Note    Patient Name: Orquidea Petersen  MRN: [id number]  Admission Date: 5/14/2022  Primary Care Provider: Christopher GRAVES Age, APRN  Principal Problem: Pelvic pain    Subjective:     Interval History: Patient doing well this morning. She reports an episode of extreme pain last night that resolved with IV dilaudid. She states that her pain is much improved this morning. She denies any nausea, vomiting, fevers, or chills. She is currently NPO for planned surgery today. She reports that she has continued to pass small amounts of tissue.     Scheduled Meds:   clindamycin (CLEOCIN) IVPB  900 mg Intravenous Q8H    tobramycin IVPB  5 mg/kg (Ideal) Intravenous Q24H     Continuous Infusions:   dextrose 5 % and 0.45 % NaCl with KCl 20 mEq Stopped (05/16/22 0059)     PRN Meds:HYDROcodone-acetaminophen, HYDROcodone-acetaminophen, HYDROmorphone, melatonin, ondansetron, promethazine, sodium chloride 0.9%    Review of patient's allergies indicates:  No Known Allergies    Objective:     Vital Signs (Most Recent):  Temp: 98.3 °F (36.8 °C) (05/16/22 0521)  Pulse: 82 (05/16/22 0521)  Resp: 18 (05/16/22 0521)  BP: (!) 94/51 (05/16/22 0521)  SpO2: 100 % (05/16/22 0521) Vital Signs (24h Range):  Temp:  [98.1 °F (36.7 °C)-99.3 °F (37.4 °C)] 98.3 °F (36.8 °C)  Pulse:  [82-96] 82  Resp:  [18-20] 18  SpO2:  [99 %-100 %] 100 %  BP: ()/(51-75) 94/51     Weight: 64.4 kg (142 lb)  Body mass index is 25.15 kg/m².  No LMP recorded.    I&O (Last 24H):    Intake/Output Summary (Last 24 hours) at 5/16/2022 0607  Last data filed at 5/16/2022 0545  Gross per 24 hour   Intake 1179.26 ml   Output --   Net 1179.26 ml       Physical Exam:   Constitutional: She is oriented to person, place, and time. She appears well-developed and well-nourished.    HENT:   Head: Normocephalic and atraumatic.      Cardiovascular: Normal rate.     Pulmonary/Chest: Effort normal. No respiratory  distress.        Abdominal: Soft. She exhibits no distension. There is no abdominal tenderness. There is no rebound and no guarding.             Musculoskeletal: Normal range of motion.       Neurological: She is alert and oriented to person, place, and time.    Skin: Skin is warm and dry.    Psychiatric: She has a normal mood and affect.       Laboratory:  CBC:   Recent Labs   Lab 05/15/22  0444   WBC 12.45   RBC 3.75*   HGB 10.7*   HCT 32.5*      MCV 87   MCH 28.5   MCHC 32.9     CMP:   Recent Labs   Lab 05/15/22  0444   GLU 90   CALCIUM 8.7   ALBUMIN 3.3*   PROT 6.4      K 3.6   CO2 22*      BUN 9   CREATININE 0.7   ALKPHOS 64   ALT 10   AST 15   BILITOT 0.1       Diagnostic Results:  US Pelvis Comp with Transvag NON-OB (xpd  Order: [id number]   Status: Final result     Visible to patient: No (inaccessible in Patient Portal)     Next appt: None     0 Result Notes    Details    Reading Physician Reading Date Result Priority   Jaye Broussard MD  [phone]  [phone] 5/15/2022 STAT     Narrative & Impression  EXAMINATION:  PELVIC ULTRASOUND     CLINICAL HISTORY:  pelvic pain;     TECHNIQUE:  Real-time ultrasound of the pelvis was performed transabdominally and transvaginally.     COMPARISON:  None.     FINDINGS:  The uterus measures 12.1 x 8.2 x 0.3.  There is a left uterine fibroid measuring 5.5 x 5.3 x 5.3 cm.  The endometrium is not well visualized secondary to the fibroid.  Nabothian cysts are seen at the lower uterine segment.     The right ovary measures 4.0 x 2.9 x 2.3 cm. Vascular flow is present.     The left ovary measures 6.6 x 4.8 x 5.5 cm. There is a 5.4 x 4.5 x 5.2 cm left ovarian cyst. Vascular flow is present.     .     There is no free fluid in the cul-de-sac.     Impression:     Uterine fibroid.     Left ovarian cyst.        Electronically signed by: Jaye Broussard  Date:                                            05/15/2022  Time:                                            00:08         Assessment/Plan:     Active Diagnoses:    Diagnosis Date Noted POA    PRINCIPAL PROBLEM:  Pelvic pain [R10.2] 05/15/2022 Yes      Problems Resolved During this Admission:     1. Pelvic pain  - VSS  - One episode of intense pain overnight that resolved with pain medication, pain much improved this morning  - Still reports passage of tissue  - Clinical picture most consistent with degenerating fibroid  - Ch/Gc negative  - Continue tobra/clinda   - Pain control: toradol, norco PRN   - Plan for hysteroscopy D&C with hysteroscopic myomectomy with diagnostic laparoscopy with possible cystectomy and myomectomy   - Likely discharge home after surgery today       Regla Hagan MD  Obstetrics & Gynecology  [facility])

## 2022-05-16 NOTE — ANESTHESIA PROCEDURE NOTES
Intubation    Date/Time: 5/16/2022 3:00 PM  Performed by: Marlin Tijerina CRNA  Authorized by: Bahman Tom MD     Intubation:     Induction:  Intravenous    Intubated:  Postinduction    Mask Ventilation:  Easy with oral airway    Attempts:  1    Attempted By:  CRNA    Method of Intubation:  Video laryngoscopy    Blade:  Sanchez 3    Laryngeal View Grade: Grade I - full view of cords      Difficult Airway Encountered?: No      Complications:  None    Airway Device:  Oral endotracheal tube    Airway Device Size:  7.5    Style/Cuff Inflation:  Cuffed (inflated to minimal occlusive pressure)    Secured at:  The lips    Placement Verified By:  Capnometry    Complicating Factors:  None    Findings Post-Intubation:  BS equal bilateral and atraumatic/condition of teeth unchanged

## 2022-05-16 NOTE — NURSING
Patient complaints of pain 10/10 scale to lower abdomen, On call provider notified, new order put in Epic by provider for Norco po PRN.    2242-Patient in bed crying, and rocking, complaints of constant pain to abdomen and requesting something IV for pain at this time instead of po pain medication. On call provider notified, new order put in Epic for dilaudid IV PRN.    2249-Dilaudid given as order, will continue to monitor.

## 2022-05-16 NOTE — ANESTHESIA PREPROCEDURE EVALUATION
05/16/2022  Orquidea Petersen is a 41 y.o., female.      Pre-op Assessment    I have reviewed the Patient Summary Reports.     I have reviewed the Nursing Notes. I have reviewed the NPO Status.   I have reviewed the Medications.     Review of Systems  Anesthesia Hx:  No problems with previous Anesthesia  Denies Family Hx of Anesthesia complications.   Denies Personal Hx of Anesthesia complications.   Social:  No Alcohol Use THC   Hematology/Oncology:         -- Anemia:   Cardiovascular:  Cardiovascular Normal Exercise tolerance: good     Pulmonary:  Pulmonary Normal    Renal/:  Renal/ Normal     Hepatic/GI:  Hepatic/GI Normal    Neurological:  Neurology Normal    Endocrine:  Endocrine Normal    Psych:  Psychiatric Normal           Physical Exam  General: Well nourished and Cooperative    Airway:  Mallampati: I   Mouth Opening: Normal  TM Distance: Normal  Neck ROM: Normal ROM    Dental:  Intact        Anesthesia Plan  Type of Anesthesia, risks & benefits discussed:    Anesthesia Type: Gen ETT  Intra-op Monitoring Plan: Standard ASA Monitors  Post Op Pain Control Plan: multimodal analgesia  Induction:  IV  Airway Plan: Video  Informed Consent: Informed consent signed with the Patient and all parties understand the risks and agree with anesthesia plan.  All questions answered.   ASA Score: 2  Day of Surgery Review of History & Physical: H&P Update referred to the surgeon/provider.    Ready For Surgery From Anesthesia Perspective.     .

## 2022-05-16 NOTE — H&P
See consult note dated 5/15 for full H&P.     In short patient admitted for pelvic pain likely secondary to left ovarian cyst and degenerating uterine fibroid. To OR for hysteroscopic myomectomy with D&C and laparoscopic ovarian cystectomy with possible oophorectomy, possible salpingectomy, possible myomectomy.    Regla Hagan MD  PGY-1 OB/GYN    I have personally seen and evaluated the patient. I have reviewed the residents note as documented above and agree with the documentation. See AM progress note from 5/16/22 for full detailed counseling.     Wilmer Valdez  5/16/2022

## 2022-05-16 NOTE — OP NOTE
[facility], [place], operated by [facility] - [facility] ([facility])  Brief Operative Note     Surgery Date: 5/16/2022      Surgeon(s) and Role:  Panel 1:     * Wilmer Valdez MD - Primary     * Malissa Molina MD - Resident - Assisting     * Regla Hagan MD - Resident - Observing  Panel 2:     * Arnie Rowan MD - Primary     * Arnie Rowan MD        Pre-op Diagnosis:   - Pelvic Pain  - Submucosal Leiomyoma with AUB  - Left adnexal mass     Post-op Diagnosis:  Post-Op Diagnosis Codes:  - Pelvic pain  - Stage 4 endometriosis  - Submucosal Uterine Leiomyoma  - ~7 cm left endometrioma  - s/p LSO/ extensive lysis of adhesions/ hysteroscopic myomectomy     Procedure(s) (LRB):  DIAGNOSTIC LAPAROSCOPY, HYSTEROSCOPIC MYOMECTOMY  AND D&C, (N/A)  LAPAROSCOPIC LYSIS OF ADHESIONS, LEFT SALPINGO-OOPHORECTOMY (Left)     Anesthesia: General/Regional     Operative Findings:   - On BME: Cervix feels approx 1cm dilated, and uterus noted to be around 12-13 wk size, with palpable mass on the posterior R portion. Uterus sounded to 11cm.   - Laparoscopically: Normal upper abdominal survey. Dense adhesive disease and extensive endometiosis noted in the pelvis. The right ovary and fallopian tube were densely adhered in the posterior cul-de-sac. The sigmoid completely covered the left adnexa and was densely adhered to the posterior uterus. Cystic structure present under the sigmoid/ extensive adhesions that was c/w an endometrioma when ruptured after trying to remove the cyst, and no normal left ovary tissue was visualized. L fallopian tube appeared dilated and adhered to the sidewall of the uterus.   - Gyn Onc called due to complexity of adhesions (see separate op note for further details). LSO performed after the endometrioma as drained given no normal left ovarian tissue identified, and given the hydrosalponx noted of the left tube. Extensive lysis of adhesions performed.   - Hemoblast placed over wound bed and hemostasis observed.   - Hysteroscopic myomectomy  performed with myosure XL.   - Fluid deficit 1125cc  - Hemostatic at conclusion of case     Estimated Blood Loss: 180 mL         Specimens:   Specimen (24h ago, onward)                         Start     Ordered     05/16/22 1740   Specimen to Pathology, Surgery Gynecology and Obstetrics  Once        Comments: Pre-op Diagnosis: Fibroids [D21.9]Procedure(s):MYOMECTOMY, HYSTEROSCOPICLAPAROSCOPY, DIAGNOSTICABLATION, ENDOMETRIUMLAPAROSCOPIC LYSIS OF ADHESIONSLAPAROSCOPIC LYSIS OF ADHESIONS Number of specimens: 3Name of specimens: 1. LEFT TUBE AND OVARY2. UTERINE FIBROIDS 3. ENDOMETRIAL CURETTINGS      References:    Click here for ordering Quick Tip   Question Answer Comment   Procedure Type: Gynecology and Obstetrics     Specimen Class: Routine/Screening     Which provider would you like to cc? DEEPALI JAIMES     Release to patient Immediate                   PROCEDURE IN DETAIL:   After proper consents were explained and written informed consent obtained, patient was taken to the operating room where general anesthesia was administered and found to be adequate.  She was placed in the dorsal lithotomy position using Yellowfin stirrups, then prepped and draped in the usual sterile fashion.  Jaquez catheter was placed prior to procedure.  A surgical timeout was performed with patient's name, date of birth, procedure to be performed, and allergies verbalized.  All OR staff in agreement.     Attention was then turned to the vagina where a jaquez catheter was placed. Attention was then turned to the abdomen, which was lifted up near the  periumbilical skin, and a Veress needle was introduced into the peritoneal cavity without difficulty. A saline drop test was preformed to validate intraperitoneal placement. The pneumoperitoneum was established with CO2 gas to a pressure of 15 mmHg. A 5 mm trocar was inserted into the abdomen. Intra-abdominal placement was confirmed with laparoscope. A second 5 mm port was placed 2 cm  above and 2 cm medial to the left anterior superior iliac spine. Pt was placed in Trendelenburg position to aid visualization. Intra-abdominal survey revealed normal appearing liver and gallbladder and lack of any visceral or vascular injury.     Findings otherwise as noted above in operative findings revealed dense pelvic adhesive disease. At this point, attempts were made to bluntly dissect the adhesions from the bowel off of the posterior uterus/ L adnexa, however after no release after a few attempts, decision was made to call gyn-onc due to complexity of adhesive disease. The pelvis was noted to be hemostatic, so attention was turned to the vagina while awaiting gyn onc assistance.     Right angles were used to visualize the cervix and a tenaculum was placed. The cervix was noted to be approx 1cm dilated already, and sounded to 11cm. The hysteroscope was then advanced into the endometrium revealing a large anterior/ fundal fibroid. The myosure XL was introduced into the hysteroscope, and after a few minutes of resection Dr. Rowan entered the OR. The uterus was noted to be hemostatic, to the hysteroscope was removed. A ABDOULAYE manipilator was then placed in the uterus, and attention was turned back to the abdomen.     Please see Dr. Rowan op note for full details on SELWYN/ LSO. After the specimen was removed from the abdomen, the trocars were all removed under direct visualization and the laparoscopic port sites were closed with 4-0 monocryl, and covered with steri strips and bandaids.      Attention was then turned to the vagina. The ABDOULAYE was removed. The hysteroscope was re-introduced through the cervical os and the uterus was distended with normal saline. A second single tooth tenaculum was placed on the posterior aspect of the cervix to prevent excess water drainage. The myosure XL was re-introduced through the hysteroscope and used to remove the majority (approximately 80%) of the remaining submucosal fibroid.  The tubal ostia were then able to be identified appeared normal. The hysteroscope was withdrawn without difficulty. Total fluid deficit noted to be 1125cc.     The uterus was then curetted in a clockwise fashion in all four quadrants. The endometrial scrapings were sent to pathology. Both tenaculums were then removed. The right anterior and posterior tenaculum sites were noted to have some bleeding. Both areas were hemostatic with the application of ring forceps.     Franks remained in place.  The patient tolerated the procedure well. All counts were correct x 2. The patient was taken to recovery area in stable condition.       Malissa Molina M.D.   OB/GYN  PGY-3    I was present and scrubbed for the surgery as listed above. I agree with the contents of the operative report documented above. Discussed surgical approach preoperatively and risks, benefits and alternatives as outlined in the daily progress note from 5/16/22. Written informed consent obtained for surgery. Surgery was complicated by stage 4 endometriosis and an enlarged ~7 cm left adnexal mass vey. The right ovary and fallopian tube were densely adhered in the posterior cul-de-sac but were otherwise normal in appearance. The sigmoid completely covered the left adnexa and was densely adhered to the posterior uterus. Cystic structure present under the sigmoid/ extensive adhesions that was clinically consistent with an endometrioma when ruptured after trying to remove the cyst, and no normal left ovary tissue was visualized. Left fallopian tube appeared dilated and tortuous and adhered to the sidewall of the uterus. Given this a LSO was performed. Hysteroscopic myomectomy and D&C was then performed and ~80% of the submucosal fibroid was removed with the residual fibroid remaining in the intramural layer which was difficult to further dissect with the Myosure. Several larger pieces of tissue were seen during the D&C portion of the surgery after the  hysteroscopic myomectomy which may represent the residual fibroid. All pathological specimens were sent to pathology for further evaluation.     Wilmer Valdez  5/17/2022

## 2022-05-17 PROBLEM — Z98.890 POST-OPERATIVE STATE: Status: ACTIVE | Noted: 2022-05-17

## 2022-05-17 LAB
BASOPHILS # BLD AUTO: 0.04 K/UL (ref 0–0.2)
BASOPHILS NFR BLD: 0.2 % (ref 0–1.9)
DIFFERENTIAL METHOD: ABNORMAL
EOSINOPHIL # BLD AUTO: 0 K/UL (ref 0–0.5)
EOSINOPHIL NFR BLD: 0.1 % (ref 0–8)
ERYTHROCYTE [DISTWIDTH] IN BLOOD BY AUTOMATED COUNT: 16.1 % (ref 11.5–14.5)
HCT VFR BLD AUTO: 32.2 % (ref 37–48.5)
HGB BLD-MCNC: 10.7 G/DL (ref 12–16)
IMM GRANULOCYTES # BLD AUTO: 0.07 K/UL (ref 0–0.04)
IMM GRANULOCYTES NFR BLD AUTO: 0.4 % (ref 0–0.5)
LYMPHOCYTES # BLD AUTO: 1.8 K/UL (ref 1–4.8)
LYMPHOCYTES NFR BLD: 10.9 % (ref 18–48)
MCH RBC QN AUTO: 28.5 PG (ref 27–31)
MCHC RBC AUTO-ENTMCNC: 33.2 G/DL (ref 32–36)
MCV RBC AUTO: 86 FL (ref 82–98)
MONOCYTES # BLD AUTO: 1.4 K/UL (ref 0.3–1)
MONOCYTES NFR BLD: 8.4 % (ref 4–15)
NEUTROPHILS # BLD AUTO: 13 K/UL (ref 1.8–7.7)
NEUTROPHILS NFR BLD: 80 % (ref 38–73)
NRBC BLD-RTO: 0 /100 WBC
PLATELET # BLD AUTO: 406 K/UL (ref 150–450)
PMV BLD AUTO: 8.2 FL (ref 9.2–12.9)
RBC # BLD AUTO: 3.75 M/UL (ref 4–5.4)
WBC # BLD AUTO: 16.26 K/UL (ref 3.9–12.7)

## 2022-05-17 PROCEDURE — 25000003 PHARM REV CODE 250: Performed by: STUDENT IN AN ORGANIZED HEALTH CARE EDUCATION/TRAINING PROGRAM

## 2022-05-17 PROCEDURE — 93005 ELECTROCARDIOGRAM TRACING: CPT

## 2022-05-17 PROCEDURE — 36415 COLL VENOUS BLD VENIPUNCTURE: CPT | Performed by: STUDENT IN AN ORGANIZED HEALTH CARE EDUCATION/TRAINING PROGRAM

## 2022-05-17 PROCEDURE — 25000003 PHARM REV CODE 250

## 2022-05-17 PROCEDURE — 93010 ELECTROCARDIOGRAM REPORT: CPT | Mod: ,,, | Performed by: INTERNAL MEDICINE

## 2022-05-17 PROCEDURE — 94761 N-INVAS EAR/PLS OXIMETRY MLT: CPT

## 2022-05-17 PROCEDURE — 63600175 PHARM REV CODE 636 W HCPCS: Performed by: STUDENT IN AN ORGANIZED HEALTH CARE EDUCATION/TRAINING PROGRAM

## 2022-05-17 PROCEDURE — 85025 COMPLETE CBC W/AUTO DIFF WBC: CPT | Performed by: STUDENT IN AN ORGANIZED HEALTH CARE EDUCATION/TRAINING PROGRAM

## 2022-05-17 PROCEDURE — 93010 EKG 12-LEAD: ICD-10-PCS | Mod: ,,, | Performed by: INTERNAL MEDICINE

## 2022-05-17 PROCEDURE — 11000001 HC ACUTE MED/SURG PRIVATE ROOM

## 2022-05-17 RX ORDER — IBUPROFEN 600 MG/1
600 TABLET ORAL EVERY 6 HOURS
Status: DISCONTINUED | OUTPATIENT
Start: 2022-05-17 | End: 2022-05-19 | Stop reason: HOSPADM

## 2022-05-17 RX ORDER — LORAZEPAM 0.5 MG/1
0.5 TABLET ORAL ONCE
Status: DISCONTINUED | OUTPATIENT
Start: 2022-05-17 | End: 2022-05-19 | Stop reason: HOSPADM

## 2022-05-17 RX ORDER — IBUPROFEN 800 MG/1
800 TABLET ORAL 3 TIMES DAILY
Qty: 30 TABLET | Refills: 1 | Status: SHIPPED | OUTPATIENT
Start: 2022-05-17 | End: 2022-05-18 | Stop reason: SDUPTHER

## 2022-05-17 RX ORDER — HYDROCODONE BITARTRATE AND ACETAMINOPHEN 5; 325 MG/1; MG/1
1 TABLET ORAL EVERY 4 HOURS PRN
Qty: 20 TABLET | Refills: 0 | Status: SHIPPED | OUTPATIENT
Start: 2022-05-17 | End: 2022-05-18 | Stop reason: HOSPADM

## 2022-05-17 RX ORDER — SIMETHICONE 80 MG
1 TABLET,CHEWABLE ORAL 3 TIMES DAILY
Status: DISCONTINUED | OUTPATIENT
Start: 2022-05-17 | End: 2022-05-19 | Stop reason: HOSPADM

## 2022-05-17 RX ADMIN — SIMETHICONE 80 MG: 80 TABLET, CHEWABLE ORAL at 08:05

## 2022-05-17 RX ADMIN — HYDROMORPHONE HYDROCHLORIDE 0.5 MG: 1 INJECTION, SOLUTION INTRAMUSCULAR; INTRAVENOUS; SUBCUTANEOUS at 10:05

## 2022-05-17 RX ADMIN — IBUPROFEN 600 MG: 600 TABLET ORAL at 05:05

## 2022-05-17 RX ADMIN — SIMETHICONE 80 MG: 80 TABLET, CHEWABLE ORAL at 05:05

## 2022-05-17 RX ADMIN — HYDROMORPHONE HYDROCHLORIDE 0.5 MG: 1 INJECTION, SOLUTION INTRAMUSCULAR; INTRAVENOUS; SUBCUTANEOUS at 12:05

## 2022-05-17 RX ADMIN — HYDROCODONE BITARTRATE AND ACETAMINOPHEN 1 TABLET: 10; 325 TABLET ORAL at 05:05

## 2022-05-17 RX ADMIN — MUPIROCIN 1 G: 20 OINTMENT TOPICAL at 10:05

## 2022-05-17 RX ADMIN — IBUPROFEN 600 MG: 600 TABLET ORAL at 11:05

## 2022-05-17 RX ADMIN — HYDROCODONE BITARTRATE AND ACETAMINOPHEN 1 TABLET: 10; 325 TABLET ORAL at 09:05

## 2022-05-17 RX ADMIN — IBUPROFEN 600 MG: 600 TABLET ORAL at 10:05

## 2022-05-17 RX ADMIN — MUPIROCIN 1 G: 20 OINTMENT TOPICAL at 08:05

## 2022-05-17 NOTE — NURSING
Gyn on call provider notified to clarify active voiding trials. Provider stated to perform active voiding trails in the am after breakfast.

## 2022-05-17 NOTE — PLAN OF CARE
Plan of care reviewed with patient. Pt AAOx4. Pt remains free from fall, injury, and skin breakdown.  Pt pain controlled with current pain regimen.  Incisions intact. Franks to gravity draining clear yellow urine. Pt complaints of nausea, PRN medication given as order and effective. Tolerating diet. Purposeful hourly rounding done. Safety maintained.     Problem: Adult Inpatient Plan of Care  Goal: Plan of Care Review  Outcome: Ongoing, Progressing  Goal: Patient-Specific Goal (Individualized)  Outcome: Ongoing, Progressing  Goal: Absence of Hospital-Acquired Illness or Injury  Outcome: Ongoing, Progressing  Goal: Optimal Comfort and Wellbeing  Outcome: Ongoing, Progressing  Goal: Readiness for Transition of Care  Outcome: Ongoing, Progressing     Problem: Pain Acute  Goal: Acceptable Pain Control and Functional Ability  Outcome: Ongoing, Progressing     Problem: Fall Injury Risk  Goal: Absence of Fall and Fall-Related Injury  Outcome: Ongoing, Progressing     Problem: Infection  Goal: Absence of Infection Signs and Symptoms  Outcome: Ongoing, Progressing     Problem: Bleeding (Surgery Nonspecified)  Goal: Absence of Bleeding  Outcome: Ongoing, Progressing     Problem: Bowel Motility Impaired (Surgery Nonspecified)  Goal: Effective Bowel Elimination  Outcome: Ongoing, Progressing     Problem: Postoperative Nausea and Vomiting (Surgery Nonspecified)  Goal: Nausea and Vomiting Relief  Outcome: Ongoing, Progressing     Problem: Postoperative Urinary Retention (Surgery Nonspecified)  Goal: Effective Urinary Elimination  Outcome: Ongoing, Progressing

## 2022-05-17 NOTE — ANESTHESIA POSTPROCEDURE EVALUATION
Anesthesia Post Evaluation    Patient: Orquidea Petersen    Procedure(s) Performed: Procedure(s) (LRB):  DIAGNOSTIC LAPAROSCOPY, HYSTEROSCOPIC MYOMECTOMY  AND D&C, (N/A)  LAPAROSCOPIC LYSIS OF ADHESIONS, LEFT SALPINGO-OOPHORECTOMY (Left)    Final Anesthesia Type: general      Patient location during evaluation: PACU  Patient participation: Yes- Able to Participate  Level of consciousness: awake and alert  Post-procedure vital signs: reviewed and stable  Pain management: adequate  Airway patency: patent    PONV status at discharge: No PONV  Anesthetic complications: no      Cardiovascular status: blood pressure returned to baseline  Respiratory status: unassisted  Hydration status: euvolemic  Follow-up not needed.          Vitals Value Taken Time   /66 05/16/22 1847   Temp 36.9 °C (98.5 °F) 05/16/22 1814   Pulse 94 05/16/22 1859   Resp 16 05/16/22 1845   SpO2 100 % 05/16/22 1859   Vitals shown include unvalidated device data.      No case tracking events are documented in the log.      Pain/Rossana Score: Pain Rating Prior to Med Admin: 7 (5/16/2022  6:34 PM)  Pain Rating Post Med Admin: 3 (5/16/2022  6:50 PM)  Rossana Score: 9 (5/16/2022  6:14 PM)

## 2022-05-17 NOTE — PROGRESS NOTES
[facility] Surg [facility]  Obstetrics & Gynecology  Progress Note    Patient Name: Orquidea Petersen  MRN: [id number]  Admission Date: 5/14/2022  Primary Care Provider: Christopher GRAVES Age, APRN  Principal Problem: Post-operative state    Subjective:     Interval History: Patient is POD#1 s/p LSO, extensive lysis of adhesions and hysteroscopic myomectomy. Patient reports mild to moderate abdominal pain that is somewhat improved by her current pain regimen. She has tolerated small amounts of po intake. She reports one episode of nausea overnight but denies any vomiting, fevers or chills. She is currently voiding via jaquez catheter and has not ambulated since surgery. Patient reports some chest pain and heaviness this morning. She endorses some anxiety related to the surgery and is unsure of if the chest symptoms are related.     Scheduled Meds:   mupirocin  1 g Nasal BID     Continuous Infusions:    PRN Meds:diphenhydrAMINE, diphenhydrAMINE, HYDROcodone-acetaminophen, HYDROcodone-acetaminophen, HYDROmorphone, HYDROmorphone, ibuprofen, melatonin, meperidine, ondansetron, oxyCODONE, prochlorperazine, prochlorperazine, simethicone, sodium chloride 0.9%, sodium chloride 0.9%    Review of patient's allergies indicates:  No Known Allergies    Objective:     Vital Signs (Most Recent):  Temp: 99.1 °F (37.3 °C) (05/17/22 0822)  Pulse: 90 (05/17/22 0822)  Resp: 16 (05/17/22 0822)  BP: 121/64 (05/17/22 0822)  SpO2: 99 % (05/17/22 0822) Vital Signs (24h Range):  Temp:  [97.8 °F (36.6 °C)-99.4 °F (37.4 °C)] 99.1 °F (37.3 °C)  Pulse:  [] 90  Resp:  [16-20] 16  SpO2:  [97 %-100 %] 99 %  BP: (102-125)/(60-81) 121/64     Weight: 64.4 kg (142 lb)  Body mass index is 25.15 kg/m².  No LMP recorded.    I&O (Last 24H):    Intake/Output Summary (Last 24 hours) at 5/17/2022 0852  Last data filed at 5/17/2022 0600  Gross per 24 hour   Intake 3120 ml   Output 2805 ml   Net 315 ml       Physical Exam:   Constitutional: She is  oriented to person, place, and time. She appears well-developed and well-nourished.    HENT:   Head: Normocephalic and atraumatic.      Cardiovascular: Normal rate.     Pulmonary/Chest: Effort normal. No respiratory distress.        Abdominal: Soft. She exhibits abdominal incision (abdominal incisions covered with steri-strips and bandages that are not saturated). She exhibits no distension. There is no abdominal tenderness. There is no rebound and no guarding.             Musculoskeletal: Normal range of motion.       Neurological: She is alert and oriented to person, place, and time.    Skin: Skin is warm and dry.    Psychiatric: She has a normal mood and affect.       Laboratory:  CBC:   Recent Labs   Lab 05/17/22  0710   WBC 16.26*   RBC 3.75*   HGB 10.7*   HCT 32.2*      MCV 86   MCH 28.5   MCHC 33.2     CMP:   No results for input(s): GLU, CALCIUM, ALBUMIN, PROT, NA, K, CO2, CL, BUN, CREATININE, ALKPHOS, ALT, AST, BILITOT in the last 48 hours.    Diagnostic Results:  US Pelvis Comp with Transvag NON-OB (xpd  Order: [id number]   Status: Final result     Visible to patient: No (inaccessible in Patient Portal)     Next appt: None     0 Result Notes    Details    Reading Physician Reading Date Result Priority   Jaye Broussard MD  [phone]  [phone] 5/15/2022 STAT     Narrative & Impression  EXAMINATION:  PELVIC ULTRASOUND     CLINICAL HISTORY:  pelvic pain;     TECHNIQUE:  Real-time ultrasound of the pelvis was performed transabdominally and transvaginally.     COMPARISON:  None.     FINDINGS:  The uterus measures 12.1 x 8.2 x 0.3.  There is a left uterine fibroid measuring 5.5 x 5.3 x 5.3 cm.  The endometrium is not well visualized secondary to the fibroid.  Nabothian cysts are seen at the lower uterine segment.     The right ovary measures 4.0 x 2.9 x 2.3 cm. Vascular flow is present.     The left ovary measures 6.6 x 4.8 x 5.5 cm. There is a 5.4 x 4.5 x 5.2 cm left ovarian cyst. Vascular flow  is present.     .     There is no free fluid in the cul-de-sac.     Impression:     Uterine fibroid.     Left ovarian cyst.        Electronically signed by: Jaye Broussard  Date:                                            05/15/2022  Time:                                           00:08         Assessment/Plan:     Active Diagnoses:    Diagnosis Date Noted POA    PRINCIPAL PROBLEM:  Post-operative state [Z98.890] 05/17/2022 Not Applicable    Pelvic pain [R10.2] 05/15/2022 Yes      Problems Resolved During this Admission:     1. Post op  - EBL: 150  - IVF @ 125 cc/h; d/c when tolerating PO  - Diet: regular  - Pain control: ibuprofen, oxy 5/10, dilaudid PRN  - In/Out: Franks in place draining clear urine; d/c Franks in AM POD #1  - Bowel function: +flatus/-BM   - PRN: simethicone, zofran, phenergan, benadryl  - Heme: Preop H/h: 10.3/32.5 >> post op H/h 10.7/32.5   - PPX: ambulation encouraged, IS encouraged, TEDs/SCDs in place    2. Chest pain  - Normal work of breathing, regular rate   - In no acute distress  - STAT EKG ordered   - Troponin ordered    Disposition: Will plan to evaluate the patient as she meets her post-operative milestones. Will tentatively plan for discharge to home on POD #1-2.      Regla Hagan MD  Obstetrics & Gynecology  [facility] Surg [facility]

## 2022-05-17 NOTE — OP NOTE
[facility] Surg ([facility])    Procedure(s) (LRB):  LAPAROSCOPIC LYSIS OF ADHESIONS  LEFT SALPINGO-OOPHORECTOMY (Left)    DATE OF SURGERY  5/16/2022     Surgeon(s) and Role  Primary: Arnie Rowan MD  Assistant: Wilmer Valdez MD  Resident - Assisting: Malissa Molina MD  Resident - Observing: Regla Hagan MD     ANESTHESIA TYPE  General/Regional     PRE-OPERATIVE DIAGNOSIS  Fibroids [D21.9]    POST-OPERATIVE DIAGNOSIS  Post-Op Diagnosis Codes:     * Fibroids [D21.9]    FINDINGS:  Fibroid uterus.  The cul-de-sac was not completely obliterated but the right ovary and fallopian tube were densely adhered to the cul-de-sac.  Complex left adnexal mass that measured approximately 8 cm.  It was densely adhered to the sigmoid colon mesentery and left pelvic sidewall.  The sigmoid colon completely covered the left adnexa.  The left ovary was completely replaced by an endometrioma and normal ovary could not be identified.  In order to safely perform the procedure, the retroperitoneal space had to be entered, the ureter identified, and the left adnexa had to be  from the left pelvic sidewall.  The left ovarian vessels were very difficult to clearly identify due to deeply infiltrative endometriosis.  Left hydrosalpinx which also made it extremely difficult to visualize the uteroovarian ligament.   In total, we spent over 1 hour lysing adhesions in order to safely perform the procedure.  There was intraoperative rupture of the mass which was consistent with an endometrioma.  It was not feasible at all to save the left ovary as normal ovary could not be identified due to fibrosis and deeply infiltrative endometriosis.  Hemoblast was applied to the cornua and left retroperitoneal space.    Procedure in Detail:  This was an intraoperative consult from Dr. Valdez.  When I arrived in the operating room he was performing a hysteroscopic procedure.  Attention was then turned back to the patient's abdomen.  We  reestablished a pneumoperitoneum and explore the abdomen and pelvis.  Please refer above for operative findings.  Under direct visualization a 5 mm left paramedian port was placed.  The left round ligament was isolated and divided with the LigaSure device and the ureter was identified.  The pararectal space was developed and the ureter was further identified.  We attempted to identify the ovarian vessels but it was extremely difficult due to her advanced and deeply infiltrative endometriosis.  With the sigmoid colon under direct visualization, the sigmoid colon mesentery was dissected off the ovary with a combination of cautery and blunt dissection. This was done without compromise to the serosa or mesenteric vessels.  During this process the endometrioma ruptured and chocolate cyst contents were evacuated.  We could not identify normal ovary due her advanced and deeply infiltrative endometriosis.  The adnexa was further freed from the sigmoid colon mesentery with the LigaSure devise.  Ultimately, with a combination of blunt dissection and cautery, we were able to completely mobilize the sigmoid colon off the mass with the serosa of the sigmoid colon under direct visualization.  The adnexa was lifted off the pelvic sidewall and the ovarian vessels and peritoneum were sealed and ligated with the LigaSure devise.  The peritoneal incision was extended to the level of the cornua.  It was extremely difficult to visualize the uteroovarian ligaement due to a hydrosalpinx that was densely adhered to the left cornua.  The hydrosalpinx and uteroovarian ligament were sealed and ligated with the LigaSure devise until the adnexa was completely freed from the cornua.  Bleeding at the left cornua was controlled with a Klepinger device.  There was no obvious injury to the sigmoid colon or ureter.  Hemoblast was applied to the retroperitoneal space and left cornua.  The pelvic was irrigated and cleared of all clot and debris.  The  "specimen was placed in a bag and removed from the right lateral port and submitted for permanent Pathology.  The case was then returned to Dr. Valdez's team.  I was present and scrubbed for the entirety of my portion of the case.     UNUSUAL CIRCUMSTANCES  Yes. Significant scaring or adhesions which distorted her anatomy and required extensive lysis in addition to making the procedure significantly more difficult to perform.    CONDITION  chronic    POST-OP COMPLICATION  No    DIABETIC  No    SPECIMENS  Specimens (From admission, onward)    None           DRAINS       Urethral Catheter 05/16/22 1518 Non-latex 16 Fr. (Active)   Site Assessment Clean;Intact 05/16/22 2007   Collection Container Urimeter 05/16/22 2007   Securement Method secured to top of thigh w/ adhesive device 05/16/22 2007   Catheter Care Performed yes 05/16/22 2007   Reason for Continuing Urinary Catheterization Post operative 05/16/22 2007   CAUTI Prevention Bundle Intact seal between catheter & drainage tubing;Drainage bag/urimeter off the floor;Securement Device in place with 1" slack;Sheeting clip in use;No dependent loops or kinks;Drainage bag/urimeter not overfilled (<2/3 full);Drainage bag/urimeter below bladder 05/16/22 2007   Output (mL) 700 mL 05/17/22 0600        ESTIMATED BLOOD LOSS  180 mL           IMPLANTS  * No implants in log *  "

## 2022-05-18 VITALS
OXYGEN SATURATION: 100 % | BODY MASS INDEX: 25.16 KG/M2 | DIASTOLIC BLOOD PRESSURE: 77 MMHG | SYSTOLIC BLOOD PRESSURE: 120 MMHG | RESPIRATION RATE: 18 BRPM | HEART RATE: 100 BPM | TEMPERATURE: 98 F | HEIGHT: 63 IN | WEIGHT: 142 LBS

## 2022-05-18 PROBLEM — Z90.721 S/P UNILATERAL SALPINGO-OOPHORECTOMY: Status: ACTIVE | Noted: 2022-05-18

## 2022-05-18 PROBLEM — Z98.890 POST-OPERATIVE STATE: Status: RESOLVED | Noted: 2022-05-17 | Resolved: 2022-05-18

## 2022-05-18 LAB
BASOPHILS # BLD AUTO: 0.06 K/UL (ref 0–0.2)
BASOPHILS NFR BLD: 0.5 % (ref 0–1.9)
DIFFERENTIAL METHOD: ABNORMAL
EOSINOPHIL # BLD AUTO: 0.2 K/UL (ref 0–0.5)
EOSINOPHIL NFR BLD: 1.5 % (ref 0–8)
ERYTHROCYTE [DISTWIDTH] IN BLOOD BY AUTOMATED COUNT: 15.8 % (ref 11.5–14.5)
HCT VFR BLD AUTO: 34.6 % (ref 37–48.5)
HGB BLD-MCNC: 11.3 G/DL (ref 12–16)
IMM GRANULOCYTES # BLD AUTO: 0.05 K/UL (ref 0–0.04)
IMM GRANULOCYTES NFR BLD AUTO: 0.4 % (ref 0–0.5)
LYMPHOCYTES # BLD AUTO: 2.4 K/UL (ref 1–4.8)
LYMPHOCYTES NFR BLD: 17.9 % (ref 18–48)
MCH RBC QN AUTO: 28.3 PG (ref 27–31)
MCHC RBC AUTO-ENTMCNC: 32.7 G/DL (ref 32–36)
MCV RBC AUTO: 87 FL (ref 82–98)
MONOCYTES # BLD AUTO: 1 K/UL (ref 0.3–1)
MONOCYTES NFR BLD: 7.3 % (ref 4–15)
NEUTROPHILS # BLD AUTO: 9.6 K/UL (ref 1.8–7.7)
NEUTROPHILS NFR BLD: 72.4 % (ref 38–73)
NRBC BLD-RTO: 0 /100 WBC
PLATELET # BLD AUTO: 465 K/UL (ref 150–450)
PMV BLD AUTO: 8.4 FL (ref 9.2–12.9)
RBC # BLD AUTO: 4 M/UL (ref 4–5.4)
WBC # BLD AUTO: 13.26 K/UL (ref 3.9–12.7)

## 2022-05-18 PROCEDURE — 94761 N-INVAS EAR/PLS OXIMETRY MLT: CPT

## 2022-05-18 PROCEDURE — 36415 COLL VENOUS BLD VENIPUNCTURE: CPT

## 2022-05-18 PROCEDURE — 25000003 PHARM REV CODE 250: Performed by: OBSTETRICS & GYNECOLOGY

## 2022-05-18 PROCEDURE — 11000001 HC ACUTE MED/SURG PRIVATE ROOM

## 2022-05-18 PROCEDURE — 85025 COMPLETE CBC W/AUTO DIFF WBC: CPT

## 2022-05-18 PROCEDURE — 94799 UNLISTED PULMONARY SVC/PX: CPT

## 2022-05-18 PROCEDURE — 25000003 PHARM REV CODE 250

## 2022-05-18 PROCEDURE — 25000003 PHARM REV CODE 250: Performed by: STUDENT IN AN ORGANIZED HEALTH CARE EDUCATION/TRAINING PROGRAM

## 2022-05-18 RX ORDER — LANOLIN ALCOHOL/MO/W.PET/CERES
1 CREAM (GRAM) TOPICAL DAILY
Status: DISCONTINUED | OUTPATIENT
Start: 2022-05-18 | End: 2022-05-19 | Stop reason: HOSPADM

## 2022-05-18 RX ORDER — IBUPROFEN 800 MG/1
800 TABLET ORAL 3 TIMES DAILY
Qty: 60 TABLET | Refills: 1 | Status: SHIPPED | OUTPATIENT
Start: 2022-05-18 | End: 2022-05-19 | Stop reason: SDUPTHER

## 2022-05-18 RX ORDER — FERROUS SULFATE 325(65) MG
325 TABLET, DELAYED RELEASE (ENTERIC COATED) ORAL DAILY
Qty: 30 TABLET | Refills: 3 | Status: SHIPPED | OUTPATIENT
Start: 2022-05-18 | End: 2022-05-19 | Stop reason: SDUPTHER

## 2022-05-18 RX ORDER — OXYCODONE AND ACETAMINOPHEN 5; 325 MG/1; MG/1
1 TABLET ORAL EVERY 4 HOURS PRN
Qty: 6 TABLET | Refills: 0 | Status: SHIPPED | OUTPATIENT
Start: 2022-05-18 | End: 2022-05-19

## 2022-05-18 RX ADMIN — SIMETHICONE 80 MG: 80 TABLET, CHEWABLE ORAL at 02:05

## 2022-05-18 RX ADMIN — IBUPROFEN 600 MG: 600 TABLET ORAL at 12:05

## 2022-05-18 RX ADMIN — SIMETHICONE 80 MG: 80 TABLET, CHEWABLE ORAL at 09:05

## 2022-05-18 RX ADMIN — FERROUS SULFATE TAB 325 MG (65 MG ELEMENTAL FE) 1 EACH: 325 (65 FE) TAB at 09:05

## 2022-05-18 RX ADMIN — MUPIROCIN 1 G: 20 OINTMENT TOPICAL at 09:05

## 2022-05-18 RX ADMIN — HYDROCODONE BITARTRATE AND ACETAMINOPHEN 1 TABLET: 10; 325 TABLET ORAL at 08:05

## 2022-05-18 RX ADMIN — IBUPROFEN 600 MG: 600 TABLET ORAL at 05:05

## 2022-05-18 RX ADMIN — SIMETHICONE 80 MG: 80 TABLET, CHEWABLE ORAL at 08:05

## 2022-05-18 NOTE — PLAN OF CARE
Plan of care reviewed with patient. Pt remains free from fall, injury, and skin breakdown. Positions self independently. Tolerating ordered diet. Voiding spontaneously without difficulty. Purposeful hourly rounding done. Safety maintained.    Problem: Adult Inpatient Plan of Care  Goal: Plan of Care Review  Outcome: Ongoing, Progressing  Goal: Patient-Specific Goal (Individualized)  Outcome: Ongoing, Progressing  Goal: Absence of Hospital-Acquired Illness or Injury  Outcome: Ongoing, Progressing  Goal: Optimal Comfort and Wellbeing  Outcome: Ongoing, Progressing  Goal: Readiness for Transition of Care  Outcome: Ongoing, Progressing     Problem: Pain Acute  Goal: Acceptable Pain Control and Functional Ability  Outcome: Ongoing, Progressing     Problem: Fall Injury Risk  Goal: Absence of Fall and Fall-Related Injury  Outcome: Ongoing, Progressing     Problem: Infection  Goal: Absence of Infection Signs and Symptoms  Outcome: Ongoing, Progressing     Problem: Bleeding (Surgery Nonspecified)  Goal: Absence of Bleeding  Outcome: Ongoing, Progressing     Problem: Bowel Motility Impaired (Surgery Nonspecified)  Goal: Effective Bowel Elimination  Outcome: Met     Problem: Postoperative Urinary Retention (Surgery Nonspecified)  Goal: Effective Urinary Elimination  Outcome: Met

## 2022-05-18 NOTE — PROGRESS NOTES
[facility]  Obstetrics & Gynecology  Progress Note    Patient Name: Orquidea Petersen  MRN: [id number]  Admission Date: 5/14/2022  Primary Care Provider: Christopher GRAVES Age, APRN  Principal Problem: Post-operative state    Subjective:     Interval History: Patient is POD#2 s/p LSO, extensive lysis of adhesions and hysteroscopic myomectomy. Patient reports mild abdominal pain that is controlled on her current pain regimen. She is tolerating po intake. She reports some mild nausea but denies any vomiting, fevers or chills. She is currently and ambulating spontaneously. Patient reports some vaginal bleeding that started last night for which she has had to change her pad once.     Scheduled Meds:   ibuprofen  600 mg Oral Q6H    LORazepam  0.5 mg Oral Once    mupirocin  1 g Nasal BID    simethicone  1 tablet Oral TID     Continuous Infusions:    PRN Meds:diphenhydrAMINE, diphenhydrAMINE, HYDROcodone-acetaminophen, HYDROcodone-acetaminophen, melatonin, ondansetron, oxyCODONE, prochlorperazine, prochlorperazine, sodium chloride 0.9%, sodium chloride 0.9%    Review of patient's allergies indicates:  No Known Allergies    Objective:     Vital Signs (Most Recent):  Temp: 98.3 °F (36.8 °C) (05/18/22 0440)  Pulse: 81 (05/18/22 0440)  Resp: 20 (05/17/22 2324)  BP: 118/69 (05/18/22 0440)  SpO2: 100 % (05/18/22 0440) Vital Signs (24h Range):  Temp:  [98.2 °F (36.8 °C)-99.4 °F (37.4 °C)] 98.3 °F (36.8 °C)  Pulse:  [] 81  Resp:  [16-20] 20  SpO2:  [96 %-100 %] 100 %  BP: (112-130)/(64-79) 118/69     Weight: 64.4 kg (142 lb)  Body mass index is 25.15 kg/m².  No LMP recorded.    I&O (Last 24H):    Intake/Output Summary (Last 24 hours) at 5/18/2022 0631  Last data filed at 5/18/2022 0000  Gross per 24 hour   Intake 1220 ml   Output 950 ml   Net 270 ml       Physical Exam:   Constitutional: She is oriented to person, place, and time. She appears well-developed and well-nourished.    HENT:   Head:  Normocephalic and atraumatic.      Cardiovascular: Normal rate.     Pulmonary/Chest: Effort normal. No respiratory distress.        Abdominal: Soft. She exhibits abdominal incision (abdominal incisions covered with steri-strips and bandages that are not saturated). She exhibits no distension. There is abdominal tenderness (appropriately tender to palpation). There is no rebound and no guarding.             Musculoskeletal: Normal range of motion.       Neurological: She is alert and oriented to person, place, and time.    Skin: Skin is warm and dry.    Psychiatric: She has a normal mood and affect.       Laboratory:  CBC:   Recent Labs   Lab 05/17/22  0710   WBC 16.26*   RBC 3.75*   HGB 10.7*   HCT 32.2*      MCV 86   MCH 28.5   MCHC 33.2     CMP:   No results for input(s): GLU, CALCIUM, ALBUMIN, PROT, NA, K, CO2, CL, BUN, CREATININE, ALKPHOS, ALT, AST, BILITOT in the last 48 hours.    Diagnostic Results:  US Pelvis Comp with Transvag NON-OB (xpd  Order: [id number]   Status: Final result     Visible to patient: No (inaccessible in Patient Portal)     Next appt: None     0 Result Notes    Details    Reading Physician Reading Date Result Priority   Jaye Broussard MD  [phone] 5/15/2022 STAT     Narrative & Impression  EXAMINATION:  PELVIC ULTRASOUND     CLINICAL HISTORY:  pelvic pain;     TECHNIQUE:  Real-time ultrasound of the pelvis was performed transabdominally and transvaginally.     COMPARISON:  None.     FINDINGS:  The uterus measures 12.1 x 8.2 x 0.3.  There is a left uterine fibroid measuring 5.5 x 5.3 x 5.3 cm.  The endometrium is not well visualized secondary to the fibroid.  Nabothian cysts are seen at the lower uterine segment.     The right ovary measures 4.0 x 2.9 x 2.3 cm. Vascular flow is present.     The left ovary measures 6.6 x 4.8 x 5.5 cm. There is a 5.4 x 4.5 x 5.2 cm left ovarian cyst. Vascular flow is present.     .     There is no free fluid in the  cul-de-sac.     Impression:     Uterine fibroid.     Left ovarian cyst.        Electronically signed by: Jaye Broussard  Date:                                            05/15/2022  Time:                                           00:08         Assessment/Plan:     Active Diagnoses:    Diagnosis Date Noted POA    PRINCIPAL PROBLEM:  Post-operative state [Z98.890] 05/17/2022 Not Applicable    Pelvic pain [R10.2] 05/15/2022 Yes      Problems Resolved During this Admission:     1. Post op  - EBL: 150  - IVF discontinued  - Diet: regular  - Pain control: ibuprofen, oxy 5/10 PRN  - In/Out: adequate  - Bowel function: +flatus/-BM   - PRN: simethicone, zofran, phenergan, benadryl  - Heme: Preop H/h: 10.3/32.5 >> post op H/h 10.7/32.5   - PPX: ambulation encouraged, IS encouraged, TEDs/SCDs in place  - Strict pad counts, if bleeding increases may need speculum exam    Disposition: Will plan to evaluate the patient as she meets her post-operative milestones. Will tentatively plan for discharge to home today      Regla Hagan MD  Obstetrics & Gynecology  [facility] ([facility])

## 2022-05-18 NOTE — PLAN OF CARE
Initial Discharge Planning Assessment:  Patient admitted on: 5/16/22     Chart reviewed, Care plan discussed with treatment team,  attending Dr. Priest     PCP updated in Epic: Patient PCP practice at [facility], updated in Epic: Bedside      DME at home: None      Current dispo: Home       Transportation: Family can provide      Power of  or Living Will: None      Anticipated DC needs from CM perspective:           05/18/22 0923   Discharge Assessment   Assessment Type Discharge Planning Assessment   Confirmed/corrected address, phone number and insurance Yes   Confirmed Demographics Correct on Facesheet   Source of Information patient;health record   Lives With alone;child(josh), dependent   Do you expect to return to your current living situation? Yes   Do you have help at home or someone to help you manage your care at home? No   Who are your caregiver(s) and their phone number(s)? Patient reported she do not have a support system   Prior to hospitilization cognitive status: Alert/Oriented   Current cognitive status: Alert/Oriented   Walking or Climbing Stairs Difficulty none   Dressing/Bathing Difficulty none   Equipment Currently Used at Home none   Readmission within 30 days? Yes  (5/7/22 [facility])   Do you currently have service(s) that help you manage your care at home? No   Is the pt/caregiver preference to resume services with current agency No   Do you take prescription medications? Yes   Do you have prescription coverage? Yes   Do you have any problems affording any of your prescribed medications? No   Is the patient taking medications as prescribed? yes   How do you get to doctors appointments? car, drives self;family or friend will provide   Are you on dialysis? No   Do you take coumadin? No   Discharge Plan A Home with family   Discharge Plan B Home Health   DME Needed Upon Discharge  none   Discharge Plan discussed with: Patient   Discharge Barriers Identified None

## 2022-05-18 NOTE — CARE UPDATE
MD to bedside with Dr. Valdez and Dr. Molina for afternoon rounds.    Afternoon Assessment:    Patient reports improved abdominal pain. She is tolerating po intake. She reports continued vaginal bleeding with passage of clots and tissue. Patient initially declined speculum exam and repeat CBC this morning. She states that everytime she gets up to use the bathroom she has these episodes of passing tissue and blood.    Temp:  [98.3 °F (36.8 °C)-98.8 °F (37.1 °C)] 98.4 °F (36.9 °C)  Pulse:  [81-96] 96  Resp:  [16-20] 17  SpO2:  [94 %-100 %] 99 %  BP: (112-129)/(65-85) 118/72    PE:  Stacey Jimenez RN present as chaperone for pelvic exam  Speculum exam: no active bleeding from cervix noted, small amount of thin red fluid in posterior vaginal vault consistent with a small amount of blood mixed with likely hysteroscopic fluid, no tissue noted in vaginal vault or cervix    A/P:  Will plan to keep patient overnight to monitor bleeding  Strict pad counts ordered  CBC ordered    Regla Hagan MD  PGY-1 OB/GYN

## 2022-05-18 NOTE — PLAN OF CARE
05/18/22 0929   Physical Activity   On average, how many days per week do you engage in moderate to strenuous exercise (like a brisk walk)? 0 days   On average, how many minutes do you engage in exercise at this level? 0 min   Financial Resource Strain   How hard is it for you to pay for the very basics like food, housing, medical care, and heating? Not hard   Housing Stability   In the last 12 months, was there a time when you were not able to pay the mortgage or rent on time? N   In the last 12 months, was there a time when you did not have a steady place to sleep or slept in a shelter (including now)? N   Transportation Needs   In the past 12 months, has lack of transportation kept you from medical appointments or from getting medications? no   In the past 12 months, has lack of transportation kept you from meetings, work, or from getting things needed for daily living? No   Food Insecurity   Within the past 12 months, you worried that your food would run out before you got the money to buy more. Never true   Within the past 12 months, the food you bought just didn't last and you didn't have money to get more. Never true   Stress   Do you feel stress - tense, restless, nervous, or anxious, or unable to sleep at night because your mind is troubled all the time - these days? Very much   Social Connections   In a typical week, how many times do you talk on the phone with family, friends, or neighbors? More than 3   How often do you get together with friends or relatives? More than 3   How often do you attend Quaker or Jehovah's witness services? Never   Do you belong to any clubs or organizations such as Quaker groups, unions, fraternal or athletic groups, or school groups? No   How often do you attend meetings of the clubs or organizations you belong to? Never   Are you , , , , never , or living with a partner? Never marrie   Alcohol Use   Q1: How often do you have a drink  containing alcohol? Never   Q2: How many drinks containing alcohol do you have on a typical day when you are drinking? None   Q3: How often do you have six or more drinks on one occasion? Never

## 2022-05-18 NOTE — CARE UPDATE
Delayed entry    Long discussion (~45 minutes) with patient, her mother and her 2 daughters regarding surgical findings and surgery and discussion of why laparoscopic lysis of adhesions and LSO and hysteroscopic myomectomy were performed. Explained to family and patient. Patient is still upset regarding removal of her left fallopian tube and the large adnexal cyst. She notes she feels hot flashes and her mood has changed. She is agitated during the discussion and raises her voice when talking.     I apologized for her bothersome symptoms and I again reviewed the pictures with the patient and her family. Patient still has some pain control issues so will remain inpatient overnight. She otherwise appears comfortable and has been able to void, ambulate, pass flatus and tolerate PO intake. Denies vaginal bleeding and passage of tissue.     I did review I would be happy to continue to care for her in the post op period and she is deciding if she will see me for a post op check or see her GYN provider at [facility].     Patient seen with Dr. Molina, Dr. Hagan and Dr. Harpal Valdez

## 2022-05-18 NOTE — NURSING
Pt AAO x 4. Pt up ad elisabet in room. Voiding yellow urine. Pain controlled with pain medications as ordered. Pt free from fall or injury. Pt states she has a poor appetite. Fluids encouraged. Call light in reach. VSS.

## 2022-05-19 ENCOUNTER — TELEPHONE (OUTPATIENT)
Dept: OBSTETRICS AND GYNECOLOGY | Facility: CLINIC | Age: 42
End: 2022-05-19
Payer: MEDICAID

## 2022-05-19 DIAGNOSIS — Z90.721 S/P UNILATERAL SALPINGO-OOPHORECTOMY: Primary | ICD-10-CM

## 2022-05-19 RX ORDER — IBUPROFEN 800 MG/1
800 TABLET ORAL 3 TIMES DAILY
Qty: 60 TABLET | Refills: 1 | Status: SHIPPED | OUTPATIENT
Start: 2022-05-19

## 2022-05-19 RX ORDER — DOCUSATE SODIUM 100 MG/1
100 CAPSULE, LIQUID FILLED ORAL 2 TIMES DAILY
Qty: 60 CAPSULE | Refills: 1 | Status: SHIPPED | OUTPATIENT
Start: 2022-05-19

## 2022-05-19 RX ORDER — OXYCODONE AND ACETAMINOPHEN 5; 325 MG/1; MG/1
1 TABLET ORAL EVERY 4 HOURS PRN
Qty: 12 EACH | Refills: 0 | Status: SHIPPED | OUTPATIENT
Start: 2022-05-19

## 2022-05-19 RX ORDER — FERROUS SULFATE 325(65) MG
325 TABLET, DELAYED RELEASE (ENTERIC COATED) ORAL DAILY
Qty: 30 TABLET | Refills: 3 | Status: SHIPPED | OUTPATIENT
Start: 2022-05-19 | End: 2022-06-18

## 2022-05-19 NOTE — PLAN OF CARE
05/19/22 0908   Final Note   Assessment Type Final Discharge Note   Anticipated Discharge Disposition Home   Hospital Resources/Appts/Education Provided Provided patient/caregiver with written discharge plan information;Appointments scheduled by Navigator/Coordinator;Appointments scheduled and added to AVS   Post-Acute Status   Discharge Delays None known at this time

## 2022-05-19 NOTE — TELEPHONE ENCOUNTER
Message received to schedule pt a follow up in the resident gyn follow up clinic. Pt has been scheduled at [address] [place] [address]. All questions answered.

## 2022-05-19 NOTE — TELEPHONE ENCOUNTER
Pt called clinic to speak to someone in regards to recent discharge from hospital.   I spoke with pt she stated she was in a lot of pain, SOB, and was clotting. I advised pt to go to the ER. She stated she would consider going.   Pt stated she was at [facility] trying to get her medication filled. She asked to speak to Dr. Valdez. Call was transferred to Dr. Valdez at that time.

## 2022-05-19 NOTE — PROGRESS NOTES
Discharge instructions reviewed with patient. Pt to  medications at Pharmacy. Pt verbalized understanding. IV removed w/ cath tip intact, WNL. DCd home w/ spouse- escorted downstairs via WC. Free from falls or injury this hospital admission.

## 2022-05-19 NOTE — DISCHARGE SUMMARY
"[facility]  Obstetrics & Gynecology  Discharge Summary    Patient Name: Orquidea Petersen  MRN: [id number]  Admission Date: 2022  Hospital Length of Stay: 2 days  Discharge Date and Time:  2022 5:27 PM  Attending Physician: Ginny Priest MD   Discharging Provider: Luis A Isbell MD  Primary Care Provider: Christopher GRAVES Age, APRN    HPI:   Orquidea Petersen is a 41 y.o. F who presented to the ED reporting pelvic pain and abnormal vaginal discharge. Patient states that 1 week ago, she started her menstrual cycle. Later that day, she developed severe lower abdominal pain and started to pass "light pink, meaty tissue" (see picture in media) via her vagina. She has taken ibuprofen throughout the week without relief and continued to noticed small pieces of pink tissue in her underwear. She presented to [facility] ED on  and had an ultrasound performed which showed a 6cm submucosal fibroid (previously 8cm) and a 6cm left ovarian cyst. Yesterday, her pain and nausea became severe so she decided to come into the ED. She denies fevers, chills, emesis, and diarrhea.      Of note, patient has a history of AUB-L and takes TXA and Fe supplements. She had an endometrial biopsy performed by her OB/GYN at [facility] on 22 but pipelle was only able to be passed to 4cm. She has a hysteroscopy/D&C booked next month at [facility]. She has no other PMHx. Her PSurgHx includes  section x1.     OB history:   , then    Miscarriage 1st trimester managed withOUT surgery     GYN:  Denies history of GC/CT, previously negative 3/18/22  Denies history of Trich, previously negative 3/18/22  Pos BV 3/18/22  HPV negative  Ca 125 85     PMH:  Denies any medical history: specifically DM, Thyroid problems, HTN, history of DVT/PE     PSH:    Denies any other abdominal surgeries, specifically appendix, gallbladder  HSG to evaluate for fertility     Hospital Course:   Patient was " admitted due to concern for degenerating fibroid with endometritis. She was started on rocephin and doxy in the ED, and then switched to gent/clinda on admission to the GYN service. She was counseled on the need for surgical intervention, and underwent procedure as listed below on 5/16 for her symptoms. She was noted to have severe endometriosis and adhesive disease in her pelvis at the time of the case. No obvious sign of infection with the hysteroscopic myomectomy, and clinically she had remained stable and afebrile so antibiotics were stopped following surgery.  Tolerated procedure well and patient was taken to recovery in a stable condition and then taken to the floor for extended recovery. Franks remained in place until afternoon of POD#1. On afternoon of POD#1 patient was seen again by the GYN team with family at bedside to again discuss intraop pathology and surgical interventions performed. The patient was still having pain so decision was made to keep her an additional night. On POD#2 she endorsed passing a few small blood clots and bleeding on the pad but refused a speculum exam and CBC. Clinically was stable appearing without heavy bleeding and vitals were stable.  Prior to discharge patient was able to void, ambulate, tolerate PO and pain was controlled with PO meds. Patient was given routine post-op instructions for which patient voiced understanding. Patient was subsequently discharged home.    Upon discharge in PM, pt confirmed that her bleeding was minimal and pain was greatly improved. She felt safe and desired discharge. Discussed return precautions. She desires follow up with female provider.       Procedure(s) (LRB):  HYSTEROSCOPIC MYOMECTOMY  AND D&C, (N/A)  LAPAROSCOPIC LYSIS OF ADHESIONS, LEFT SALPINGO-OOPHORECTOMY (Left)  LAPAROSCOPY, DIAGNOSTIC (N/A)     Consults:   Consults (From admission, onward)        Status Ordering Provider     Inpatient consult to Obstetrics / Gynecology  Once         Provider:  Beckie Shaw MD    Completed DAYA NICHOLAS          Significant Diagnostic Studies: Labs:   CBC   Recent Labs   Lab 05/15/22  0444 05/17/22  0710 05/18/22  1753   WBC 12.45 16.26* 13.26*   HGB 10.7* 10.7* 11.3*   HCT 32.5* 32.2* 34.6*   MCV 87 86 87    406 465*          Pending Diagnostic Studies:     Procedure Component Value Units Date/Time    Specimen to Pathology, Surgery Gynecology and Obstetrics [[id number]] Collected: 05/16/22 1814    Order Status: Sent Lab Status: In process Updated: 05/17/22 0630    Specimen: Tissue         Final Active Diagnoses:    Diagnosis Date Noted POA    PRINCIPAL PROBLEM:  S/P Left salpingo-oophorectomy [Z90.721] 05/18/2022 Not Applicable    Pelvic pain [R10.2] 05/15/2022 Yes      Problems Resolved During this Admission:    Diagnosis Date Noted Date Resolved POA    Post-operative state [Z98.890] 05/17/2022 05/18/2022 Not Applicable        Discharged Condition: good    Disposition: Home or Self Care    Follow Up:   Follow-up Information     Jessa Macario. Schedule an appointment as soon as possible for a visit in 2 week(s).    Why: For hospital follow up           [facility]. Schedule an appointment as soon as possible for a visit in 2 week(s).    Specialty: Obstetrics and Gynecology  Why: For routine post-op  Contact information:  [address], [address]  [facility] [address] [phone]                     Patient Instructions:      Diet Adult Regular     No driving until:   Order Comments: For at least 2 weeks and you are able to safely slam on the breaks without pain and not taking narcotic pain medications     Pelvic Rest   Order Comments: Pelvic rest (nothing in the vagina) for 2-3 weeks.     Notify your health care provider if you experience any of the following:  temperature >100.4     Notify your health care provider if you experience any of the following:  persistent nausea and vomiting or diarrhea     Notify your health care  provider if you experience any of the following:  severe uncontrolled pain     Notify your health care provider if you experience any of the following:  redness, tenderness, or signs of infection (pain, swelling, redness, odor or green/yellow discharge around incision site)     Notify your health care provider if you experience any of the following:  difficulty breathing or increased cough     Notify your health care provider if you experience any of the following:  severe persistent headache     Notify your health care provider if you experience any of the following:  worsening rash     Notify your health care provider if you experience any of the following:  persistent dizziness, light-headedness, or visual disturbances     Notify your health care provider if you experience any of the following:  increased confusion or weakness     Notify your health care provider if you experience any of the following:   Order Comments: Heavy vaginal bleeding saturating more than 1 pad per hour for at least 2 hours.         Walking only for 4 weeks after surgery     Medications:  Reconciled Home Medications:      Medication List      START taking these medications    ferrous sulfate 325 (65 FE) MG EC tablet  Take 1 tablet (325 mg total) by mouth once daily.     oxyCODONE-acetaminophen 5-325 mg per tablet  Commonly known as: PERCOCET  Take 1 tablet by mouth every 4 (four) hours as needed for Pain.        CONTINUE taking these medications    ibuprofen 800 MG tablet  Commonly known as: ADVIL,MOTRIN  Take 1 tablet (800 mg total) by mouth 3 (three) times daily.     tranexamic acid 650 mg tablet  Commonly known as: LYSTEDA  Take 1,300 mg by mouth 3 (three) times daily.            Luis A Isbell MD  Obstetrics & Gynecology  [facility]

## 2022-05-19 NOTE — TELEPHONE ENCOUNTER
----- Message from Luis A Isbell MD sent at 5/18/2022 10:03 PM CDT -----  Please schedule follow up for patient with female provider on GYN day. In approx 1 week.    FLORENCE Isbell MD  OBGYN PGY-3

## 2022-05-19 NOTE — DISCHARGE INSTRUCTIONS
Discharge instructions reviewed with patient. Pt verbalized understanding. IV removed w/ cath tip intact, WNL. DCd home w/ spouse- escorted downstairs via WC. Free from falls or injury this hospital admission.

## 2022-05-19 NOTE — TELEPHONE ENCOUNTER
She notes she was not able to get her prescriptions and she prefers to have them sent to [facility] on [address]. My team will work on fixing    She is passing blood clots that are large. She notes she is having difficulty breathing. She has severe uterine pain.     I recommended she present to the emergency room for evaluation.    She mentions she has a patch on her back and wants to know if she can take it off. I advised her I'm not sure what is on her back and this would be another reason to present to the emergency room for evaluation.     She asked about wound care and I let her know she can take the bandages off and leave them open to air and showers only for 4 weeks.    The patient noted she was having trouble breathing. The phone disconnected. I tried to call back and the person on the other line said her mother is not breathing. I called back again and the person talking asked me to call 911 and send an ambulance to [address] [place] [address]. I immediately called the  and provided the address above and they noted they would send a paramedic to the address requested to further evaluate the patient.     Wilmer Valdez

## 2022-05-20 ENCOUNTER — TELEPHONE (OUTPATIENT)
Dept: OBSTETRICS AND GYNECOLOGY | Facility: HOSPITAL | Age: 42
End: 2022-05-20
Payer: MEDICAID

## 2022-05-20 NOTE — TELEPHONE ENCOUNTER
Attempted to call patient x 2 regarding pathology results from surgery.   No answer, left voice mail to call back to clinic during business hours.     Malissa Molina M.D.   OB/GYN  PGY-3

## 2022-05-25 LAB
FINAL PATHOLOGIC DIAGNOSIS: NORMAL
GROSS: NORMAL
Lab: NORMAL
SUPPLEMENTAL DIAGNOSIS: NORMAL

## 2022-05-27 ENCOUNTER — TELEPHONE (OUTPATIENT)
Dept: OBSTETRICS AND GYNECOLOGY | Facility: CLINIC | Age: 42
End: 2022-05-27
Payer: MEDICAID

## 2022-05-27 NOTE — TELEPHONE ENCOUNTER
"ID confirmed.    She has been going through a lot of stress, anxiety and pain.    She notes she was on the floor passed out with pain. Then she notes she had tissue come out. She also notes dark brown discharge and kind of yellowish color. She notes the pain is still there. She tries not to take the medication. She tried to take the motrin. It's not working.  She notes yesterday she had trouble breathing. She has left sided pain in her ribs.   She has an irritating pain in her pelvis.   She feels like sometimes her heart is squeezing tightly and she can't bear.   She also notes it's difficult to walk due to "something is grinding" when she walks.   She notes she's been going through a lot of depressions. I did recommend referral to behavioral health at [facility]. She would like to think about this and let know.     Because of her reported symptoms I recommended she present to the emergency room now to be evaluated.     Reviewed her pathology.   1. LEFT TUBE AND OVARY:   THE LEFT OVARY SHOWS A BENIGN HEMORRHAGIC CYST MOST CONSISTENT WITH A   HEMORRHAGIC CORPUS LUTEUM CYST.  THE LEFT FALLOPIAN TUBE SHOWS EXTENSIVE OLD   AND RECENT HEMORRHAGE.  THIS MAY BE DUE TO TORSION.  NO DEFINITIVE   ENDOMETRIOSIS IS IDENTIFIED.  NO NEOPLASIA PRESENT.   2. FRAGMENTS OF BENIGN ENDOMETRIUM AND UNDERLYING SMOOTH MUSCLE PROLIFERATION   CONSISTENT WITH A SUBMUCOSAL LEIOMYOMA.  THESE TISSUE FRAGMENTS MEASURES 7.5   CM IN AGGREGATE.   3. FRAGMENTS OF WEAKLY PROLIFERATIVE ENDOMETRIUM WITH AREAS OF TUBAL   METAPLASIA          She again noted she wished her cyst had not been removed.     Questions answered.     Wilmer Valdez  5/27/2022            "

## 2022-06-21 ENCOUNTER — TELEPHONE (OUTPATIENT)
Dept: OBSTETRICS AND GYNECOLOGY | Facility: CLINIC | Age: 42
End: 2022-06-21
Payer: MEDICAID

## 2022-06-21 NOTE — TELEPHONE ENCOUNTER
Attempted to call patient to see how she is feeling. No answer. Voicemail left with my contact number and I asked her to call me at her convenience.    Wilmer Valdez

## 2022-08-08 ENCOUNTER — TELEPHONE (OUTPATIENT)
Dept: OBSTETRICS AND GYNECOLOGY | Facility: CLINIC | Age: 42
End: 2022-08-08
Payer: MEDICAID

## 2022-08-08 NOTE — TELEPHONE ENCOUNTER
Call pt to have her come in to sign paperwork. Person who answered the phone stated she would give the pt the message to call office in regards to my call.     ----- Message from Emma Glaindo LPN sent at 8/8/2022  2:03 PM CDT -----  Regarding: RE: [facility]  So we need to get the [employer] form signed but dont date it, is she coming in anytime soon? If not can you call her and ask her to come in and sign   ----- Message -----  From: Iwona Trevino MA  Sent: 8/8/2022   1:23 PM CDT  To: Emma Galindo LPN  Subject: FW: [facility]                              I believe he did.      ----- Message -----  From: Emma Galindo LPN  Sent: 8/8/2022  12:02 PM CDT  To: Iwona Trevino MA  Subject: [facility]                              Did Rodrigoleonel do her surgery though ?  ----- Message -----  From: Iwona Trevino MA  Sent: 8/8/2022  11:15 AM CDT  To: Emma Galindo LPN  Subject: [facility]: [facility]                              This was done while pt was admitted. We haven't seen her in clinic. How can I obtain this from the hospital to have placed in chart?    Thanks,  Iwona     ----- Message -----  From: Natasha Deras  Sent: 8/8/2022  10:41 AM CDT  To: Courtney Guillen Staff  Subject: [facility]                                  Who called: Antonette from [facility]     What is the request in detail: Patient is requesting a call back.  She states she needs the Medicaid 96 form scanned into patient's chart for her hysterectomy DOS 05/14/22  Please advise.    Can the clinic reply by [facility]? No    Best call back number: [phone]     Additional Information: N/A

## 2024-01-08 NOTE — CARE UPDATE
MD to bedside. EKG with normal sinus rhythm. Patient declined troponin as she did not want her blood drawn at this time. Discussed recommendation of troponin. Patient reports that she may be amendable to lab draw after eating some breakfast. Patient reports gas pain and left rib pain. Ibuprofen changed from PRN to scheduled and simethicone added.     Regla Hagan MD  PGY-1 OB/GYN     Returned patient's call and let her know Dr. Diamond is in surgery today and I will make her aware of patient's request for a phone call tomorrow to answer some questions on upcoming surgery.

## (undated) DEVICE — DECANTER 6 VIAL

## (undated) DEVICE — SYR B-D DISP CONTROL 10CC100/C

## (undated) DEVICE — SEALER LIGASURE MARYLAND 37CM

## (undated) DEVICE — GLOVE BIOGEL SKINSENSE PI 7.0

## (undated) DEVICE — Device

## (undated) DEVICE — SOL BETADINE 5%

## (undated) DEVICE — SYR 10CC LUER LOCK

## (undated) DEVICE — KIT WING PAD POSITIONING

## (undated) DEVICE — DEVICE MYOSURE XL FMS TISS REM

## (undated) DEVICE — GRASPER EPIX LAPSCP 5MM 35CM

## (undated) DEVICE — ELECTRODE REM PLYHSV RETURN 9

## (undated) DEVICE — SUT MCRYL PLUS 4-0 PS2 27IN

## (undated) DEVICE — CONTAINER SPECIMEN OR STER 4OZ

## (undated) DEVICE — CLOSURE SKIN STERI STRIP 1/2X4

## (undated) DEVICE — SET BASIN 48X48IN 6000ML RING

## (undated) DEVICE — CANNULA ENDOPATH XCEL 5X100MM

## (undated) DEVICE — DRESSING ADHESIVE ISLAND 3 X 6

## (undated) DEVICE — SET CYSTO IRRIGATION UNIV SPIK

## (undated) DEVICE — SET EXTENSION 30 IN W/LL ROLLE

## (undated) DEVICE — SYR SAFETY 3CC 22G X 1.5 ECLIP

## (undated) DEVICE — SET TRI-LUMEN FILTERED TUBE

## (undated) DEVICE — PAD PREP 50/CA

## (undated) DEVICE — SOL STRL WATER INJ 1000ML BG

## (undated) DEVICE — PORT ACCESS 8MM W/120MM LOW

## (undated) DEVICE — DRAPE STERI LONG

## (undated) DEVICE — GRASPER EPIX 5X20MM 45CM

## (undated) DEVICE — TROCAR ENDOPATH XCEL 5X100MM

## (undated) DEVICE — DEVICE MYOSURE REACH

## (undated) DEVICE — SOL PVP-I SCRUB 7.5% 4OZ

## (undated) DEVICE — TIP RUMI GREEN DISPOSABLE6.7MM

## (undated) DEVICE — GLOVE SURGEONS ULTRA TOUCH 6.5

## (undated) DEVICE — TROCAR KII FIOS 11MM X 100MM

## (undated) DEVICE — UNDERGLOVE BIOGEL PI SZ 6.5 LF

## (undated) DEVICE — SOL IRR NACL .9% 3000ML

## (undated) DEVICE — BELLOW CANN HEMOBLAST 1.65GR

## (undated) DEVICE — APPLICATOR HEMOBLAST LAPSCP

## (undated) DEVICE — GLOVE BIOGEL SKINSENSE PI 7.5

## (undated) DEVICE — TOWEL OR DISP STRL BLUE 4/PK

## (undated) DEVICE — PACK FLUENT DISPOSABLE

## (undated) DEVICE — NDL INSUFFLATION VERRES 120MM

## (undated) DEVICE — UNDERGLOVES BIOGEL PI SZ 7 LF

## (undated) DEVICE — GLOVE BIOGEL 7.0

## (undated) DEVICE — PACK LAPAROSCOPY BAPTIST

## (undated) DEVICE — TROCAR KII FIOS 5MM X 100MM

## (undated) DEVICE — MARKER SKIN STND TIP BLUE BARR

## (undated) DEVICE — SOL NS 1000CC

## (undated) DEVICE — BANDAGE ADH SOFT FLEX 1X3

## (undated) DEVICE — SEAL LENS SCOPE MYOSURE

## (undated) DEVICE — BAG TISS RETRV MONARCH 10MM

## (undated) DEVICE — SEE L#120831